# Patient Record
Sex: MALE | Race: OTHER | Employment: UNEMPLOYED | URBAN - METROPOLITAN AREA
[De-identification: names, ages, dates, MRNs, and addresses within clinical notes are randomized per-mention and may not be internally consistent; named-entity substitution may affect disease eponyms.]

---

## 2017-02-18 ENCOUNTER — HOSPITAL ENCOUNTER (OUTPATIENT)
Dept: MRI IMAGING | Age: 52
Discharge: HOME | End: 2017-02-18
Attending: ORTHOPAEDIC SURGERY

## 2017-02-18 DIAGNOSIS — M87.052 AVASCULAR NECROSIS OF BONE OF LEFT HIP (HCC): ICD-10-CM

## 2017-02-18 DIAGNOSIS — M16.12 ARTHRITIS OF LEFT HIP: ICD-10-CM

## 2017-02-27 ENCOUNTER — HOSPITAL ENCOUNTER (OUTPATIENT)
Dept: MRI IMAGING | Age: 52
Discharge: HOME | End: 2017-02-27
Attending: ORTHOPAEDIC SURGERY
Payer: MEDICARE

## 2017-02-27 DIAGNOSIS — M19.90 ARTHRITIS: ICD-10-CM

## 2017-02-27 PROCEDURE — 72195 MRI PELVIS W/O DYE: CPT

## 2017-06-01 ENCOUNTER — OFFICE VISIT (OUTPATIENT)
Dept: FAMILY MEDICINE CLINIC | Age: 52
End: 2017-06-01

## 2017-06-01 DIAGNOSIS — Z71.89 COUNSELING AND COORDINATION OF CARE: Primary | ICD-10-CM

## 2018-07-24 ENCOUNTER — APPOINTMENT (OUTPATIENT)
Dept: GENERAL RADIOLOGY | Age: 53
End: 2018-07-24
Attending: PHYSICIAN ASSISTANT
Payer: MEDICARE

## 2018-07-24 ENCOUNTER — HOSPITAL ENCOUNTER (EMERGENCY)
Age: 53
Discharge: HOME | End: 2018-07-24
Attending: EMERGENCY MEDICINE
Payer: MEDICARE

## 2018-07-24 VITALS
DIASTOLIC BLOOD PRESSURE: 112 MMHG | HEIGHT: 66 IN | OXYGEN SATURATION: 96 % | SYSTOLIC BLOOD PRESSURE: 177 MMHG | WEIGHT: 249 LBS | TEMPERATURE: 98.4 F | RESPIRATION RATE: 20 BRPM | HEART RATE: 97 BPM | BODY MASS INDEX: 40.02 KG/M2

## 2018-07-24 DIAGNOSIS — M79.89 LEFT ARM SWELLING: Primary | ICD-10-CM

## 2018-07-24 DIAGNOSIS — M79.602 LEFT ARM PAIN: ICD-10-CM

## 2018-07-24 LAB
ANION GAP SERPL CALC-SCNC: 6 MMOL/L (ref 5–15)
BASOPHILS # BLD: 0 K/UL (ref 0–0.1)
BASOPHILS NFR BLD: 0 % (ref 0–1)
BUN SERPL-MCNC: 7 MG/DL (ref 6–20)
BUN/CREAT SERPL: 6 (ref 12–20)
CALCIUM SERPL-MCNC: 8.4 MG/DL (ref 8.5–10.1)
CHLORIDE SERPL-SCNC: 100 MMOL/L (ref 97–108)
CO2 SERPL-SCNC: 28 MMOL/L (ref 21–32)
CREAT SERPL-MCNC: 1.19 MG/DL (ref 0.7–1.3)
DIFFERENTIAL METHOD BLD: NORMAL
EOSINOPHIL # BLD: 0.2 K/UL (ref 0–0.4)
EOSINOPHIL NFR BLD: 2 % (ref 0–7)
ERYTHROCYTE [DISTWIDTH] IN BLOOD BY AUTOMATED COUNT: 11.9 % (ref 11.5–14.5)
GLUCOSE SERPL-MCNC: 268 MG/DL (ref 65–100)
HCT VFR BLD AUTO: 41.4 % (ref 36.6–50.3)
HGB BLD-MCNC: 14 G/DL (ref 12.1–17)
IMM GRANULOCYTES # BLD: 0 K/UL (ref 0–0.04)
IMM GRANULOCYTES NFR BLD AUTO: 0 % (ref 0–0.5)
LYMPHOCYTES # BLD: 2.6 K/UL (ref 0.8–3.5)
LYMPHOCYTES NFR BLD: 24 % (ref 12–49)
MCH RBC QN AUTO: 30.5 PG (ref 26–34)
MCHC RBC AUTO-ENTMCNC: 33.8 G/DL (ref 30–36.5)
MCV RBC AUTO: 90.2 FL (ref 80–99)
MONOCYTES # BLD: 1 K/UL (ref 0–1)
MONOCYTES NFR BLD: 10 % (ref 5–13)
NEUTS SEG # BLD: 7 K/UL (ref 1.8–8)
NEUTS SEG NFR BLD: 65 % (ref 32–75)
NRBC # BLD: 0 K/UL (ref 0–0.01)
NRBC BLD-RTO: 0 PER 100 WBC
PLATELET # BLD AUTO: 268 K/UL (ref 150–400)
PMV BLD AUTO: 10.3 FL (ref 8.9–12.9)
POTASSIUM SERPL-SCNC: 4 MMOL/L (ref 3.5–5.1)
RBC # BLD AUTO: 4.59 M/UL (ref 4.1–5.7)
SODIUM SERPL-SCNC: 134 MMOL/L (ref 136–145)
URATE SERPL-MCNC: 6.3 MG/DL (ref 3.5–7.2)
WBC # BLD AUTO: 10.9 K/UL (ref 4.1–11.1)

## 2018-07-24 PROCEDURE — 73130 X-RAY EXAM OF HAND: CPT

## 2018-07-24 PROCEDURE — 74011250636 HC RX REV CODE- 250/636: Performed by: PHYSICIAN ASSISTANT

## 2018-07-24 PROCEDURE — 93971 EXTREMITY STUDY: CPT

## 2018-07-24 PROCEDURE — 99281 EMR DPT VST MAYX REQ PHY/QHP: CPT

## 2018-07-24 PROCEDURE — 80048 BASIC METABOLIC PNL TOTAL CA: CPT | Performed by: PHYSICIAN ASSISTANT

## 2018-07-24 PROCEDURE — 85025 COMPLETE CBC W/AUTO DIFF WBC: CPT | Performed by: PHYSICIAN ASSISTANT

## 2018-07-24 PROCEDURE — 84550 ASSAY OF BLOOD/URIC ACID: CPT | Performed by: PHYSICIAN ASSISTANT

## 2018-07-24 PROCEDURE — 96374 THER/PROPH/DIAG INJ IV PUSH: CPT

## 2018-07-24 PROCEDURE — 36415 COLL VENOUS BLD VENIPUNCTURE: CPT | Performed by: PHYSICIAN ASSISTANT

## 2018-07-24 PROCEDURE — 96375 TX/PRO/DX INJ NEW DRUG ADDON: CPT

## 2018-07-24 RX ORDER — MORPHINE SULFATE 4 MG/ML
4 INJECTION INTRAVENOUS
Status: COMPLETED | OUTPATIENT
Start: 2018-07-24 | End: 2018-07-24

## 2018-07-24 RX ORDER — PREDNISONE 10 MG/1
TABLET ORAL
Qty: 21 TAB | Refills: 0 | Status: SHIPPED | OUTPATIENT
Start: 2018-07-24 | End: 2018-07-24

## 2018-07-24 RX ORDER — OXYCODONE AND ACETAMINOPHEN 5; 325 MG/1; MG/1
1 TABLET ORAL
Status: DISCONTINUED | OUTPATIENT
Start: 2018-07-24 | End: 2018-07-24

## 2018-07-24 RX ORDER — PREDNISONE 20 MG/1
60 TABLET ORAL
Status: DISCONTINUED | OUTPATIENT
Start: 2018-07-24 | End: 2018-07-24

## 2018-07-24 RX ORDER — OXYCODONE AND ACETAMINOPHEN 5; 325 MG/1; MG/1
1 TABLET ORAL
Qty: 20 TAB | Refills: 0 | Status: SHIPPED | OUTPATIENT
Start: 2018-07-24 | End: 2019-02-27

## 2018-07-24 RX ORDER — PREDNISONE 10 MG/1
TABLET ORAL
Qty: 21 TAB | Refills: 0 | Status: SHIPPED | OUTPATIENT
Start: 2018-07-24 | End: 2019-02-27

## 2018-07-24 RX ADMIN — MORPHINE SULFATE 4 MG: 4 INJECTION INTRAVENOUS at 01:49

## 2018-07-24 RX ADMIN — METHYLPREDNISOLONE SODIUM SUCCINATE 125 MG: 125 INJECTION, POWDER, FOR SOLUTION INTRAMUSCULAR; INTRAVENOUS at 01:49

## 2018-07-24 NOTE — ED PROVIDER NOTES
HPI Comments: 45 y/o right-hand dominant male with PMHx of HTN, HLD, gout, sleep apnea and sciatica, presenting with complaint of left wrist and arm swelling. The patient states that he noticed some pain in his left wrist 3 days ago that initially felt similar to his typical gout flares. However the pain has been worsening over the past few days, with associated swelling of his wrist, hand and fingers. Today the pain began to spread up his arm to his shoulder, which prompted him to present to the ED for evaluation. He denies any recent falls, cuts, abrasions or other traumatic injuries. The pain is 10/10, described as sharp and burning. He has tried indomethacin with minimal relief. He endorses tingling in his finger tips but denies fever chills, redness, weakness or numbness. The history is provided by the patient. Past Medical History:   Diagnosis Date    Arthritis     gout    Gout     Heart disease     Hypercholesterolemia     Hypertension     Obstructive sleep apnea (adult) (pediatric) 11/30/2012    Other ill-defined conditions(799.89)     LE varicosities, hyperlipidemia    Sciatica     Unspecified essential hypertension 11/30/2012       Past Surgical History:   Procedure Laterality Date    HX APPENDECTOMY      HX ORTHOPAEDIC      L gt toe         Family History:   Problem Relation Age of Onset    Diabetes Mother     Hypertension Mother        Social History     Social History    Marital status:      Spouse name: N/A    Number of children: N/A    Years of education: N/A     Occupational History    Not on file.      Social History Main Topics    Smoking status: Former Smoker     Types: Cigarettes     Quit date: 12/16/1990    Smokeless tobacco: Never Used    Alcohol use 0.0 oz/week     0 Standard drinks or equivalent per week      Comment: social    Drug use: No    Sexual activity: Yes     Partners: Female     Other Topics Concern    Not on file     Social History Narrative ** Merged History Encounter **              ALLERGIES: Diovan [valsartan]; Diovan [valsartan]; Elavil; and Elavil    Review of Systems   Constitutional: Negative for chills and fever. Respiratory: Negative for shortness of breath. Cardiovascular: Negative for chest pain. Gastrointestinal: Negative for nausea and vomiting. Musculoskeletal: Positive for arthralgias (left hand and wrist pain). Negative for myalgias. Skin: Negative for wound. Neurological: Negative for weakness and numbness. All other systems reviewed and are negative. Vitals:    07/24/18 0110   BP: (!) 219/127   Pulse: (!) 101   Resp: 20   Temp: 98.4 °F (36.9 °C)   SpO2: 96%   Weight: 112.9 kg (249 lb)   Height: 5' 6\" (1.676 m)            Physical Exam   Constitutional: He is oriented to person, place, and time. He appears well-developed and well-nourished. No distress. HENT:   Head: Normocephalic and atraumatic. Eyes: Conjunctivae and EOM are normal.   Neck: Normal range of motion. Neck supple. Cardiovascular: Normal rate. Pulses:       Radial pulses are 2+ on the right side, and 2+ on the left side. Pulmonary/Chest: Effort normal. No respiratory distress. Musculoskeletal:   Left wrist and hand with generalized swelling and tenderness to palpation. No erythema or increased warmth. Fingers are warm with brisk capillary refill. Minimal tenderness of proximal forearm and upper arm. Neurological: He is alert and oriented to person, place, and time. Skin: Skin is warm and dry. He is not diaphoretic. Nursing note and vitals reviewed.        MDM  Number of Diagnoses or Management Options     Amount and/or Complexity of Data Reviewed  Clinical lab tests: ordered and reviewed  Tests in the radiology section of CPT®: ordered and reviewed  Discuss the patient with other providers: yes ( [facility], ED attending)    Patient Progress  Patient progress: stable        ED Course       Procedures      45 y/o right-hand dominant male with PMHx of HTN, HLD, gout, sleep apnea and sciatica, presenting with complaint of left wrist and arm swelling. History and exam concerning for possible gout flare, cellulitis, DVT or fracture. Will obtain LUE duplex ultrasound, XR left hand, CBC, BMP and uric acid level. Labs, XR and ultrasound pending. Care of this patient transferred to Dr. Idalmis Tay at 2:00 AM, please see his documentation for further ED course and disposition.

## 2018-07-24 NOTE — DISCHARGE INSTRUCTIONS
We hope that we have addressed all of your medical concerns. The examination and treatment you received in the Emergency Department were for an emergent problem and were not intended as complete care. It is important that you follow up with your healthcare provider(s) for ongoing care. If your symptoms worsen or do not improve as expected, and you are unable to reach your usual health care provider(s), you should return to the Emergency Department. Today's healthcare is undergoing tremendous change, and patient satisfaction surveys are one of the many tools to assess the quality of medical care. You may receive a survey from the [employer] regarding your experience in the Emergency Department. I hope that your experience has been completely positive, particularly the medical care that I provided. As such, please participate in the survey; anything less than excellent does not meet my expectations or intentions. [facility] and [address] [place] [address] participate in nationally recognized quality of care measures. If your blood pressure is greater than 120/80, as reported below, we urge that you seek medical care to address the potential of high blood pressure, commonly known as hypertension. Hypertension can be hereditary or can be caused by certain medical conditions, pain, stress, or \"white coat syndrome. \"       Please make an appointment with your health care provider(s) for follow up of your Emergency Department visit. VITALS:   Patient Vitals for the past 8 hrs:   Temp Pulse Resp BP SpO2   07/24/18 0110 98.4 °F (36.9 °C) (!) 101 20 (!) 219/127 96 %          Thank you for allowing us to provide you with medical care today. We realize that you have many choices for your emergency care needs. Please choose us in the future for any continued health care needs. Kelvin Ingram [address] [facility]   Office: [phone]            Recent Results (from the past 24 hour(s))   CBC WITH AUTOMATED DIFF    Collection Time: 07/24/18  1:47 AM   Result Value Ref Range    WBC 10.9 4.1 - 11.1 K/uL    RBC 4.59 4. 10 - 5.70 M/uL    HGB 14.0 12.1 - 17.0 g/dL    HCT 41.4 36.6 - 50.3 %    MCV 90.2 80.0 - 99.0 FL    MCH 30.5 26.0 - 34.0 PG    MCHC 33.8 30.0 - 36.5 g/dL    RDW 11.9 11.5 - 14.5 %    PLATELET 296 223 - 000 K/uL    MPV 10.3 8.9 - 12.9 FL    NRBC 0.0 0  WBC    ABSOLUTE NRBC 0.00 0.00 - 0.01 K/uL    NEUTROPHILS 65 32 - 75 %    LYMPHOCYTES 24 12 - 49 %    MONOCYTES 10 5 - 13 %    EOSINOPHILS 2 0 - 7 %    BASOPHILS 0 0 - 1 %    IMMATURE GRANULOCYTES 0 0.0 - 0.5 %    ABS. NEUTROPHILS 7.0 1.8 - 8.0 K/UL    ABS. LYMPHOCYTES 2.6 0.8 - 3.5 K/UL    ABS. MONOCYTES 1.0 0.0 - 1.0 K/UL    ABS. EOSINOPHILS 0.2 0.0 - 0.4 K/UL    ABS. BASOPHILS 0.0 0.0 - 0.1 K/UL    ABS. IMM. GRANS. 0.0 0.00 - 0.04 K/UL    DF AUTOMATED     METABOLIC PANEL, BASIC    Collection Time: 07/24/18  1:47 AM   Result Value Ref Range    Sodium 134 (L) 136 - 145 mmol/L    Potassium 4.0 3.5 - 5.1 mmol/L    Chloride 100 97 - 108 mmol/L    CO2 28 21 - 32 mmol/L    Anion gap 6 5 - 15 mmol/L    Glucose 268 (H) 65 - 100 mg/dL    BUN 7 6 - 20 MG/DL    Creatinine 1.19 0.70 - 1.30 MG/DL    BUN/Creatinine ratio 6 (L) 12 - 20      GFR est AA >60 >60 ml/min/1.73m2    GFR est non-AA >60 >60 ml/min/1.73m2    Calcium 8.4 (L) 8.5 - 10.1 MG/DL   URIC ACID    Collection Time: 07/24/18  1:47 AM   Result Value Ref Range    Uric acid 6.3 3.5 - 7.2 MG/DL       Xr Hand Lt Min 3 V    Result Date: 7/24/2018  EXAM:  XR HAND LT MIN 3 V INDICATION:  swelling and pain. COMPARISON: None. FINDINGS: Three views of the left hand demonstrate no fracture, dislocation or other acute osseous or articular abnormality. Mild degree degenerative change in the basal joint. There is soft tissue swelling about the distal forearm, wrist and hand.    No visible radiopaque foreign body IMPRESSION:  1. Swelling in the distal forearm, wrist and hand.

## 2018-07-24 NOTE — PROCEDURES
[address]  *** FINAL REPORT ***    Name: Amanda Montiel  MRN: [id number]    Outpatient  : 18 Dec 1965  HIS Order #: [id number]  [id number] [facility] Visit #: [id number]  Date: 2018    TYPE OF TEST: Peripheral Venous Testing    REASON FOR TEST  Pain in limb, Limb swelling    Left Arm:-  Deep venous thrombosis:           No  Superficial venous thrombosis:    No      INTERPRETATION/FINDINGS  PROCEDURE:  LEFT UPPER EXTREMITY VENOUS DUPLEX. Evaluation of upper  extremity veins with ultrasound (B-mode imaging, pulsed Doppler, color   Doppler). Includes the internal jugular, subclavian, axillary,  brachial, radial, ulnar, basilic, and cephalic veins. FINDINGS:  Yesenia Gregory scale and color flow duplex images of the veins of the   left upper extremity and bilateral internal jugular veins demonstrate   compressibility, absence of filling defects, reflux or phlebitic  changes of the bilateral internal jugular, subclavian, axillary, upper   arm and forearm veins of the right arm. CONCLUSION:  Normal left upper extremity venous duplex. No deep vein  thrombosis or thrombophlebitis. No evidence of thrombus in  contralateral right subclavian vein. NOTE: Avascular, hyper echoic,  complex structure noted at the anterior wrist/hand measuring 2.51 x  0.73 consistant with a possible hematoma. ADDITIONAL COMMENTS    I have personally reviewed the data relevant to the interpretation of  this  study. TECHNOLOGIST: Timothy Barrera  Signed: 2018 03:30 AM    PHYSICIAN: Walt Mcdonough.  Trev Hamilton MD  Signed: 2018 07:36 AM

## 2019-01-23 ENCOUNTER — OFFICE VISIT (OUTPATIENT)
Dept: FAMILY MEDICINE CLINIC | Age: 54
End: 2019-01-23

## 2019-01-23 VITALS
BODY MASS INDEX: 39.28 KG/M2 | TEMPERATURE: 97.7 F | HEART RATE: 108 BPM | DIASTOLIC BLOOD PRESSURE: 81 MMHG | HEIGHT: 66 IN | RESPIRATION RATE: 17 BRPM | WEIGHT: 244.4 LBS | SYSTOLIC BLOOD PRESSURE: 142 MMHG | OXYGEN SATURATION: 95 %

## 2019-01-23 DIAGNOSIS — M25.562 ACUTE PAIN OF LEFT KNEE: Primary | ICD-10-CM

## 2019-01-23 DIAGNOSIS — M25.462 KNEE EFFUSION, LEFT: ICD-10-CM

## 2019-01-23 RX ORDER — GLIPIZIDE 5 MG/1
TABLET ORAL 2 TIMES DAILY
COMMUNITY
End: 2019-03-04 | Stop reason: SDUPTHER

## 2019-01-23 RX ORDER — LIDOCAINE HYDROCHLORIDE 10 MG/ML
3 INJECTION INFILTRATION; PERINEURAL ONCE
Qty: 3 ML | Refills: 0
Start: 2019-01-23 | End: 2019-01-23

## 2019-01-23 RX ORDER — TRIAMCINOLONE ACETONIDE 40 MG/ML
40 INJECTION, SUSPENSION INTRA-ARTICULAR; INTRAMUSCULAR ONCE
Qty: 1 ML | Refills: 0
Start: 2019-01-23 | End: 2019-01-23

## 2019-01-23 RX ORDER — METOPROLOL TARTRATE 100 MG/1
TABLET ORAL DAILY
COMMUNITY
End: 2019-03-04 | Stop reason: SDUPTHER

## 2019-01-23 RX ORDER — METFORMIN HYDROCHLORIDE 850 MG/1
TABLET ORAL 2 TIMES DAILY WITH MEALS
COMMUNITY
End: 2019-03-04 | Stop reason: SDUPTHER

## 2019-01-23 NOTE — PROGRESS NOTES
Sarita Cooper is a 48 y.o. male here today to address the following issues:  Chief Complaint   Patient presents with   [facility]    Medication Refill     Indomethacin     Here for complaints of new onset left knee pain. Stated 23 days ago. SHELBY: Twisted knee in [place], no pop. Has had an effusion since. Pain is sharp, and painful with any movement. Tried Aspercreme and Indomethacin and did not help much. Has not taken gout medication for over 6 months. Gout flares usually in knee and ankle. Past Medical History:   Diagnosis Date    Arthritis     gout    Gout     Heart disease     Hypercholesterolemia     Hypertension     Obstructive sleep apnea (adult) (pediatric) 2012    Other ill-defined conditions(799.89)     LE varicosities, hyperlipidemia    Sciatica     Unspecified essential hypertension 2012     Past Surgical History:   Procedure Laterality Date    HX APPENDECTOMY      HX ORTHOPAEDIC      L gt toe     Social History     Socioeconomic History    Marital status:      Spouse name: Not on file    Number of children: Not on file    Years of education: Not on file    Highest education level: Not on file   Social Needs    Financial resource strain: Not on file    Food insecurity - worry: Not on file    Food insecurity - inability: Not on file   [employer] needs - medical: Not on file   [employer] needs - non-medical: Not on file   Occupational History    Not on file   Tobacco Use    Smoking status: Former Smoker     Types: Cigarettes     Last attempt to quit: 1990     Years since quittin.1    Smokeless tobacco: Never Used   Substance and Sexual Activity    Alcohol use:  Yes     Alcohol/week: 0.0 oz     Comment: social    Drug use: No    Sexual activity: Yes     Partners: Female   Other Topics Concern    Not on file   Social History Narrative    ** Merged History Encounter **            Allergies   Allergen Reactions    Diovan [Valsartan] Other (comments)     Boils & knots al over head & upper body    Diovan [Valsartan] Hives    Elavil Other (comments)     Hallucinations. Seeing shadows and hearing voices.  Elavil Other (comments)     confusion    Statins-Hmg-Coa Reductase Inhibitors Other (comments)       Current Outpatient Medications   Medication Sig    metoprolol tartrate (LOPRESSOR) 100 mg IR tablet Take  by mouth two (2) times a day.  glipiZIDE (GLUCOTROL) 5 mg tablet Take  by mouth two (2) times a day.  allopurinol (ZYLOPRIM) 300 mg tablet Take 300 mg by mouth daily.  aspirin 81 mg chewable tablet Take 81 mg by mouth daily.  lisinopril (PRINIVIL, ZESTRIL) 40 mg tablet Take 40 mg by mouth daily.  indomethacin (INDOCIN) 25 mg capsule 1-2 tid prn pain, take with food.  lisinopril (PRINIVIL, ZESTRIL) 40 mg tablet Take 1 Tab by mouth daily.  metFORMIN (GLUCOPHAGE) 850 mg tablet Take  by mouth two (2) times daily (with meals).  oxyCODONE-acetaminophen (PERCOCET) 5-325 mg per tablet Take 1 Tab by mouth every four (4) hours as needed for Pain. Max Daily Amount: 6 Tabs.  predniSONE (STERAPRED DS) 10 mg dose pack Take as directed.  allopurinol (ZYLOPRIM) 100 mg tablet Take 1 Tab by mouth daily.  carvedilol (COREG) 12.5 mg tablet Take 1 Tab by mouth two (2) times daily (with meals).  amLODIPine (NORVASC) 10 mg tablet Take  by mouth daily.  carvedilol (COREG) 6.25 mg tablet Take  by mouth two (2) times daily (with meals).  gemfibrozil (LOPID) 600 mg tablet Take 600 mg by mouth two (2) times a day.  methocarbamol (ROBAXIN) 750 mg tablet Take 1 Tab by mouth four (4) times daily. Indications: MUSCLE SPASM    indomethacin (INDOCIN) 50 mg capsule     amLODIPine (NORVASC) 10 mg tablet Take 1 Tab by mouth daily.  gemfibrozil (LOPID) 600 mg tablet Take 1 Tab by mouth two (2) times a day.  Aspirin, Buffered 81 mg tab Take  by mouth.      No current facility-administered medications for this visit. Review of Systems   Constitutional: Negative for fever. Musculoskeletal: Positive for joint pain and myalgias. Visit Vitals  /81   Pulse (!) 108   Temp 97.7 °F (36.5 °C) (Oral)   Resp 17   Ht 5' 6\" (1.676 m)   Wt 244 lb 6.4 oz (110.9 kg)   SpO2 95%   BMI 39.45 kg/m²       Physical Exam    Gen: Well appearing. No apparent distress. Alert and oriented. Responds to all questions appropriately. Lungs: No labored respirations. Talking in complete sentences without difficulty. Musculoskeletal:  Knee: LEFT  Knee Effusion: Large  Quadriceps atrophy: None   Popliteal (Bakers) Cyst: Negative   Patellofemoral crepitus: Negative    ROM:  Flexion: 110  Extension: 0     Alignment:  Patellar tracking: +1    Dynamic Test:  Gait: Antalgic  Assistive devices: None    Palpation:   Patella tenderness: None  Patellar tendon tenderness: None  Quad tendon tenderness: None  Medial joint line tenderness: None  Lateral joint line tenderness: None  MCL tenderness: None  LCL tenderness: None  Medial facet tenderness: None  Lateral facet tenderness: None  Condyle tenderness: None  Tibia tubercle tenderness: None  Proximal fibula tenderness: None  Plica tenderness: None  Prepatellar bursa tenderness: None  Pes bursa tenderness: None  ITB tenderness: None    Ligament/Meniscal Exam:  Patellar apprehension (medial/lateral): Negative   Lachman: Negative, with good endpoint   Anterior Drawer: Negative   Posterior Drawer: Negative   Valgus stress: Negative with good endpoint   Varus stress: Negative with good endpoint     Neuro/Vascular : Pulses intact, no edema, and neurologically intact . Skin: No obvious rash or skin breakdown. No results found for this or any previous visit (from the past 12 hour(s)). 1. Acute pain of left knee  - XR KNEE LT 3 V; Future    2. Knee effusion, left  - CELL COUNT AND DIFF, BODY FLUID  - CRYSTALS, SYNOVIAL FLUID    Likely gout flare. XR reviewed and showing mild OA. Follow up in 1 month and check labs. Patient has not been taking his allopurinol for the past 6 months. States it did not help and was last taken this. Follow-up Disposition:  Return in about 1 month (around 2/23/2019) for Follow up knee pain/gout flare . Derrek Sawyer MD, CAQSM, RMSK      [facility]  OFFICE PROCEDURE PROGRESS NOTE        Chart reviewed for the following:   Suzanne Gongora MD, have reviewed the History, Physical and updated the Allergic reactions for [address] performed immediately prior to start of procedure:   Suzanne Gongora MD, have performed the following reviews on [facility] prior to the start of the procedure:            * Patient was identified by name and date of birth   * Agreement on procedure being performed was verified  * Risks and Benefits explained to the patient  * Procedure site verified and marked as necessary  * Patient was positioned for comfort  * Consent was signed and verified     Time: 2:37pm      Date of procedure: 1/23/2019    Procedure performed by:  Scott Raines MD    Provider assisted by: Dennise Mclaughlin LPN    Patient assisted by: wife    Indications:   Unexplained joint effusion    Procedure:  After verbal consent was obtained, risks and benefits of the procedure were discussed with the patient and alternatives discussed. Time out performed, cross checking patient ID and procedure. Confirmed that the patient does not have history of prior adverse reactions, active infections, or relevant allergies. There was erythema, or warmth, and the skin was clear. The skin was cleaned using chlorohexadine x 2. Topical anesthesia was achieved with ethyl chloride. A 18 gauge needle was inserted into the left knee via a lateral approach. The site was injected with a mixture of 40mg of Kenalog and  3ml 1% Lidocaine. The injection was completed without complication, and a bandage was applied.  25cc of clear, straw-like fluid was drained. Patient felt 50% better after injection. After Care Instructions: The patient is asked to continue to rest the joint for a few more days before resuming regular activities. It may be more painful for the first 1-2 days. Watch for fever, or increased swelling or persistent pain in the joint. Call or return to clinic prn if such symptoms occur or there is failure to improve as anticipated.

## 2019-01-23 NOTE — PROGRESS NOTES
Chief Complaint   Patient presents with   [facility] Establ[facility] Care    Medication Refill     Indomethacin     Blood pressure 142/81, pulse (!) 108, temperature 97.7 °F (36.5 °C), temperature source Oral, resp. rate 17, height 5' 6\" (1.676 m), weight 244 lb 6.4 oz (110.9 kg), SpO2 95 %. 1. Have you been to the ER, urgent care clinic since your last visit? Hospitalized since your last visit? No    2. Have you seen or consulted any other health care providers outside of the [address] [place] [address] since your last visit? Include any pap smears or colon screening.  No

## 2019-01-23 NOTE — PATIENT INSTRUCTIONS
Gota: Instrucciones de cuidado - [ Gout: Care Instructions ]  Instrucciones de cuidado    La gota es rafa forma de artritis causada por la acumulación de rosas de ácido úrico en rafa articulación. Causa ataques repentinos de dolor, hinchazón, enrojecimiento y rigidez, por lo general en rafa articulación, sobre todo en el dedo mgao del pie. La gota usualmente se presenta sin rafa causa evidente. Arpit puede desencadenarse debido al consumo de alcohol (especialmente cerveza), mariscos y smiley oconnell. Yamilex ciertos medicamentos, jack diuréticos o aspirina, también puede ocasionar un ataque de [place]. Yamilex los medicamentos miguel jack se los receten y asistir con regularidad a las consultas de seguimiento con adams médico pueden ayudarle a evitar ataques de gota en el futuro. La atención de seguimiento es rafa parte clave de adams tratamiento y seguridad. Asegúrese de hacer y acudir a todas las citas, y llame a adams médico si está teniendo problemas. También es rafa buena idea saber los resultados de olvin exámenes y mantener rafa lista de los medicamentos que diane. ¿Cómo puede cuidarse en el hogar? · Si la articulación está hinchada, colóquese hielo o rafa compresa fría en la felicia larry 10 a 20 minutos cada vez. Póngase un paño ramachandran entre el hielo y la piel. · Eleve el miembro adolorido sobre rafa almohada mientras se aplica hielo, o en cualquier momento que se siente o se acueste larry los 3 días siguientes. Trate de mantenerlo por encima del nivel del corazón. Cheval ayudará a reducir la hinchazón. · Descanse las articulaciones adoloridas. Evite las actividades que impliquen poner peso o esfuerzo sobre las articulaciones por unos kanwal. Benjamin breves descansos en olvin actividades regulares larry el día. · [employer] medicamentos exactamente jack le fueron recetados. Llame a adams médico si dru estar teniendo problemas con adams medicamento.   · [employer] analgésicos (medicamentos para el dolor) exactamente según las indicaciones. ? Si el médico le recetó un analgésico, tómelo según las indicaciones. ? Si no está tomando un analgésico recetado, pregúntele a adams médico si puede ivana sharon de [employer]. · Coma menos mariscos y smiley oconnell. · Pregúntele a adams médico antes de ivana alcohol. · Perder peso, si tiene sobrepeso, podría ayudarle a reducir los ataques de Lucas. Arpit no jon rafa Ronan Douse. La pérdida de un peso considerable en corto tiempo puede provocar un ataque de Lucas. ¿Cuándo debe pedir ayuda? Llame a adams médico ahora mismo o busque atención médica inmediata si:    · Tiene fiebre.     · La articulación le duele tanto que no puede usarla.     · Tiene repentina e inexplicable hinchazón, enrojecimiento, calor o dolor intenso en rafa o más articulaciones.    Preste especial atención a los cambios en adams reid y asegúrese de comunicarse con adams médico si:    · Tiene dolor en rafa articulación.     · Thi síntomas empeoran o no mejoran después de 2 ó 3 días. ¿Dónde puede encontrar más información en inglés? Ksenia Hayes a http://deon-promise.info/. [facility] I894 en la búsqueda para aprender más acerca de \"Gota: Instrucciones de cuidado - [ Gout: Care Instructions ]. \"  Revisado: 10 merle, 2018  Versión del contenido: 11.9  © 3163-3201 -[employer]. Las instrucciones de cuidado fueron adaptadas bajo licencia por [employer] (which disclaims liability or warranty for this information). Si usted tiene [place] afección médica o sobre estas instrucciones, siempre pregunte a adams profesional de reid. [facility] niega toda garantía o responsabilidad por adams uso de esta información.

## 2019-01-24 LAB
APPEARANCE FLD: ABNORMAL
CIL COLUMNAR LINING CELLS FLD: 0 %
COLOR FLD: YELLOW
CRYSTALS FLD MICRO: NORMAL
EOSINOPHIL NFR FLD MANUAL: 0 %
LYMPHOCYTES NFR FLD MANUAL: 5 %
MACROPHAGES NFR FLD MANUAL: 12 %
NEUTROPHILS NFR FLD MANUAL: 83 %
NUC CELL # FLD: 2697 CELLS/UL (ref 0–200)
RBC # FLD AUTO: ABNORMAL /UL

## 2019-02-01 ENCOUNTER — HOSPITAL ENCOUNTER (EMERGENCY)
Age: 54
Discharge: HOME | End: 2019-02-01
Attending: EMERGENCY MEDICINE
Payer: MEDICARE

## 2019-02-01 ENCOUNTER — OFFICE VISIT (OUTPATIENT)
Dept: FAMILY MEDICINE CLINIC | Age: 54
End: 2019-02-01

## 2019-02-01 VITALS
TEMPERATURE: 97.7 F | RESPIRATION RATE: 14 BRPM | WEIGHT: 235 LBS | OXYGEN SATURATION: 98 % | HEART RATE: 83 BPM | SYSTOLIC BLOOD PRESSURE: 145 MMHG | DIASTOLIC BLOOD PRESSURE: 86 MMHG | BODY MASS INDEX: 37.93 KG/M2

## 2019-02-01 VITALS
BODY MASS INDEX: 37.77 KG/M2 | WEIGHT: 235 LBS | HEIGHT: 66 IN | OXYGEN SATURATION: 96 % | DIASTOLIC BLOOD PRESSURE: 79 MMHG | TEMPERATURE: 98 F | SYSTOLIC BLOOD PRESSURE: 112 MMHG | HEART RATE: 106 BPM | RESPIRATION RATE: 16 BRPM

## 2019-02-01 DIAGNOSIS — H53.8 BLURRY VISION: Primary | ICD-10-CM

## 2019-02-01 DIAGNOSIS — R73.9 HYPERGLYCEMIA: Primary | ICD-10-CM

## 2019-02-01 DIAGNOSIS — R35.89 POLYURIA: ICD-10-CM

## 2019-02-01 PROBLEM — E66.01 SEVERE OBESITY (HCC): Status: ACTIVE | Noted: 2019-02-01

## 2019-02-01 LAB
ALBUMIN SERPL-MCNC: 3.3 G/DL (ref 3.5–5)
ALBUMIN/GLOB SERPL: 0.8 {RATIO} (ref 1.1–2.2)
ALP SERPL-CCNC: 134 U/L (ref 45–117)
ALT SERPL-CCNC: 51 U/L (ref 12–78)
ANION GAP SERPL CALC-SCNC: 11 MMOL/L (ref 5–15)
APPEARANCE UR: CLEAR
AST SERPL-CCNC: 39 U/L (ref 15–37)
BACTERIA URNS QL MICRO: NEGATIVE /HPF
BASE DEFICIT BLDV-SCNC: 3.5 MMOL/L
BASOPHILS # BLD: 0 K/UL (ref 0–0.1)
BASOPHILS NFR BLD: 0 % (ref 0–1)
BDY SITE: ABNORMAL
BILIRUB SERPL-MCNC: 0.6 MG/DL (ref 0.2–1)
BILIRUB UR QL STRIP: NEGATIVE
BILIRUB UR QL: NEGATIVE
BUN SERPL-MCNC: 15 MG/DL (ref 6–20)
BUN/CREAT SERPL: 13 (ref 12–20)
CALCIUM SERPL-MCNC: 9.2 MG/DL (ref 8.5–10.1)
CHLORIDE SERPL-SCNC: 101 MMOL/L (ref 97–108)
CO2 SERPL-SCNC: 24 MMOL/L (ref 21–32)
COLOR UR: ABNORMAL
COMMENT, HOLDF: NORMAL
CREAT SERPL-MCNC: 1.2 MG/DL (ref 0.7–1.3)
DIFFERENTIAL METHOD BLD: ABNORMAL
EOSINOPHIL # BLD: 0.2 K/UL (ref 0–0.4)
EOSINOPHIL NFR BLD: 2 % (ref 0–7)
EPITH CASTS URNS QL MICRO: ABNORMAL /LPF
ERYTHROCYTE [DISTWIDTH] IN BLOOD BY AUTOMATED COUNT: 12 % (ref 11.5–14.5)
GLOBULIN SER CALC-MCNC: 4.3 G/DL (ref 2–4)
GLUCOSE BLD STRIP.AUTO-MCNC: 215 MG/DL (ref 65–100)
GLUCOSE BLD STRIP.AUTO-MCNC: 373 MG/DL (ref 65–100)
GLUCOSE DOSE-GTT, POCT, GLDSPOCT: 402
GLUCOSE SERPL-MCNC: 380 MG/DL (ref 65–100)
GLUCOSE UR STRIP.AUTO-MCNC: >1000 MG/DL
GLUCOSE UR-MCNC: NORMAL MG/DL
HCO3 BLDV-SCNC: 22 MMOL/L (ref 23–28)
HCT VFR BLD AUTO: 47.1 % (ref 36.6–50.3)
HGB BLD-MCNC: 15.9 G/DL (ref 12.1–17)
HGB UR QL STRIP: NEGATIVE
HYALINE CASTS URNS QL MICRO: ABNORMAL /LPF (ref 0–5)
IMM GRANULOCYTES # BLD AUTO: 0 K/UL (ref 0–0.04)
IMM GRANULOCYTES NFR BLD AUTO: 0 % (ref 0–0.5)
KETONES P FAST UR STRIP-MCNC: NEGATIVE MG/DL
KETONES UR QL STRIP.AUTO: NEGATIVE MG/DL
LEUKOCYTE ESTERASE UR QL STRIP.AUTO: NEGATIVE
LYMPHOCYTES # BLD: 3.3 K/UL (ref 0.8–3.5)
LYMPHOCYTES NFR BLD: 28 % (ref 12–49)
MCH RBC QN AUTO: 30.2 PG (ref 26–34)
MCHC RBC AUTO-ENTMCNC: 33.8 G/DL (ref 30–36.5)
MCV RBC AUTO: 89.4 FL (ref 80–99)
MONOCYTES # BLD: 0.7 K/UL (ref 0–1)
MONOCYTES NFR BLD: 6 % (ref 5–13)
NEUTS SEG # BLD: 7.2 K/UL (ref 1.8–8)
NEUTS SEG NFR BLD: 63 % (ref 32–75)
NITRITE UR QL STRIP.AUTO: NEGATIVE
NRBC # BLD: 0 K/UL (ref 0–0.01)
NRBC BLD-RTO: 0 PER 100 WBC
PCO2 BLDV: 41 MMHG (ref 41–51)
PH BLDV: 7.35 [PH] (ref 7.32–7.42)
PH UR STRIP: 5 [PH] (ref 5–8)
PH UR STRIP: 5.5 [PH] (ref 4.6–8)
PLATELET # BLD AUTO: 310 K/UL (ref 150–400)
PMV BLD AUTO: 10.9 FL (ref 8.9–12.9)
PO2 BLDV: 33 MMHG (ref 25–40)
POTASSIUM SERPL-SCNC: 4.1 MMOL/L (ref 3.5–5.1)
PROT SERPL-MCNC: 7.6 G/DL (ref 6.4–8.2)
PROT UR QL STRIP: NEGATIVE
PROT UR STRIP-MCNC: NEGATIVE MG/DL
RBC # BLD AUTO: 5.27 M/UL (ref 4.1–5.7)
RBC #/AREA URNS HPF: ABNORMAL /HPF (ref 0–5)
SAMPLES BEING HELD,HOLD: NORMAL
SAO2 % BLDV: 60 % (ref 65–88)
SAO2% DEVICE SAO2% SENSOR NAME: ABNORMAL
SERVICE CMNT-IMP: ABNORMAL
SERVICE CMNT-IMP: ABNORMAL
SODIUM SERPL-SCNC: 136 MMOL/L (ref 136–145)
SP GR UR REFRACTOMETRY: 1.02 (ref 1–1.03)
SP GR UR STRIP: 1 (ref 1–1.03)
SPECIMEN SITE: ABNORMAL
UA UROBILINOGEN AMB POC: NORMAL (ref 0.2–1)
UR CULT HOLD, URHOLD: NORMAL
URINALYSIS CLARITY POC: CLEAR
URINALYSIS COLOR POC: YELLOW
URINE BLOOD POC: NEGATIVE
URINE LEUKOCYTES POC: NEGATIVE
URINE NITRITES POC: NEGATIVE
UROBILINOGEN UR QL STRIP.AUTO: 0.2 EU/DL (ref 0.2–1)
WBC # BLD AUTO: 11.5 K/UL (ref 4.1–11.1)
WBC URNS QL MICRO: ABNORMAL /HPF (ref 0–4)

## 2019-02-01 PROCEDURE — 99284 EMERGENCY DEPT VISIT MOD MDM: CPT

## 2019-02-01 PROCEDURE — 85025 COMPLETE CBC W/AUTO DIFF WBC: CPT

## 2019-02-01 PROCEDURE — 96361 HYDRATE IV INFUSION ADD-ON: CPT

## 2019-02-01 PROCEDURE — 81001 URINALYSIS AUTO W/SCOPE: CPT

## 2019-02-01 PROCEDURE — 74011250636 HC RX REV CODE- 250/636: Performed by: STUDENT IN AN ORGANIZED HEALTH CARE EDUCATION/TRAINING PROGRAM

## 2019-02-01 PROCEDURE — 36415 COLL VENOUS BLD VENIPUNCTURE: CPT

## 2019-02-01 PROCEDURE — 74011636637 HC RX REV CODE- 636/637: Performed by: EMERGENCY MEDICINE

## 2019-02-01 PROCEDURE — 82962 GLUCOSE BLOOD TEST: CPT

## 2019-02-01 PROCEDURE — 82803 BLOOD GASES ANY COMBINATION: CPT

## 2019-02-01 PROCEDURE — 96374 THER/PROPH/DIAG INJ IV PUSH: CPT

## 2019-02-01 PROCEDURE — 80053 COMPREHEN METABOLIC PANEL: CPT

## 2019-02-01 PROCEDURE — 74011250636 HC RX REV CODE- 250/636: Performed by: EMERGENCY MEDICINE

## 2019-02-01 RX ADMIN — SODIUM CHLORIDE 1000 ML: 900 INJECTION, SOLUTION INTRAVENOUS at 13:14

## 2019-02-01 RX ADMIN — INSULIN HUMAN 10 UNITS: 100 INJECTION, SOLUTION PARENTERAL at 13:17

## 2019-02-01 NOTE — DISCHARGE INSTRUCTIONS
Patient Education        Tierney Neigh el azúcar rafael en la curly - [ Learning About High Blood Sugar ]  ¿Qué es el azúcar rafael en la curly? El cuerpo [employer] alimentos que usted come en glucosa (azúcar), la cual [place] para obtener energía. Arpit si adams cuerpo es incapaz de utilizar el azúcar de inmediato, puede acumularse en la curly y provocar un nivel alto de azúcar en la curly. Cuando la cantidad de azúcar en la curly permanece muy rafael por demasiado tiempo, usted puede tener diabetes. La diabetes es [facility] enfermedad que puede causar problemas graves de [facility]. Lo brody es que hacer cambios en adams estilo de roxann puede ayudarle a hacer que el azúcar en la curly vuelva a un nivel normal y ayudarle a evitar o retrasar la diabetes. ¿Cuál es la causa del azúcar rafael en la curly? El azúcar (glucosa) puede acumularse en la curly si usted:  · Tiene sobrepeso. · Tiene antecedentes familiares de diabetes. · Katy ciertos medicamentos, jack esteroides. ¿Cuáles son los síntomas? Tener azúcar rafael en la curly puede no causar ningún síntoma. O puede hacerle sentir mucha sed o mucha hambre. También puede orinar con más frecuencia de lo normal, tener visión borrosa o perder peso sin intentarlo. ¿Cómo se trata el azúcar rafael en la curly? Usted puede ivana medidas para reducir adams nivel de azúcar en la curly si entiende lo que lo eleva. Adams médico puede desear que usted aprenda a revisarse el nivel de azúcar en la curly en casa. Entonces puede dony cómo la enfermedad, el estrés o diferentes tipos de alimentos o medicamentos aumentan o disminuyen adams nivel de azúcar en la curly. Pueden ser necesarias otras pruebas para dony si tiene diabetes. ¿Cómo se puede prevenir el azúcar rafael en la curly? · Controle adams peso. Si tiene sobrepeso, bajar solo un poco de peso puede [place] danial. Reducir la grasa alrededor de adams cintura es lo más importante.   · Limite la cantidad de calorías, dulces y grasas poco saludables que come. Pregúntele a adams médico si un dietista puede ayudarle. Un dietista registrado puede ayudarle a elaborar planes de alimentación que se adapten a adams estilo de roxann. · Deric al menos 30 minutos de ejercicio la mayoría de los días de la [place]. El ejercicio ayuda a controlar el azúcar en la curly. También ayuda a mantener un peso saludable. Caminar es rafa buena opción. Es posible que también quiera hacer otras actividades, jack correr, nadar, [employer], o jugar tenis o deportes de equipo. · Si adams médico le recetó medicamentos, tómelos exactamente jack se le indicó. Llame a adams médico si dru que está teniendo un problema con adams medicamento. Recibirá [employer] medicamentos específicos recetados por adams médico.  La atención de seguimiento es rafa parte clave de adams tratamiento y seguridad. Asegúrese de hacer y acudir a todas las citas, y llame a adams médico si está teniendo problemas. También es rafa buena idea saber los resultados de olvin exámenes y mantener rafa lista de los medicamentos que diane. ¿Dónde puede encontrar más información en inglés? Robert Christianson a http://deon-promise.info/. Escriba O108 en la búsqueda para aprender más acerca de \"Aprenda sobre el azúcar rafael en la curly - [ Learning About High Blood Sugar ]. \"  Revisado: 25 julio, 2018  Versión del contenido: 11.9  © 4174-5342 [employer]. Las instrucciones de cuidado fueron adaptadas bajo licencia por [employer] (which disclaims liability or warranty for this information). Si usted tiene [place] afección médica o sobre estas instrucciones, siempre pregunte a adams profesional de reid. [employer] niega toda garantía o responsabilidad por adams uso de esta información. Patient Education        Aprenda sobre los esteroides y el azúcar rafael en la curly - [ Learning About Steroids and High Blood Sugar ]  ¿Qué son los esteroides?     Noni Dempsey a menudo recetan medicamentos esteroideos (corticosteroides) para tratar muchas afecciones. Estos medicamentos ayudan a calmar la respuesta del cuerpo a la inflamación. Pueden tomarse para el asma, la EPOC, el dolor de espalda o las reacciones alérgicas. También se usan para otras afecciones jack las enfermedades autoinmunitarias, la artritis y ciertos tipos de cáncer. Estos medicamentos pueden administrarse en forma de pastillas o inyecciones. Los esteroides de los que se habla aquí no son el tipo de esteroides usados en fisicoculturismo. ¿Qué es un nivel alto de azúcar en la curly? El cuerpo [employer] alimentos que usted come en glucosa (azúcar), la cual [place] para obtener energía. Eso es algo brody. Arpit si adams cuerpo no puede usar el azúcar inmediatamente, esta puede acumularse en la curly y causar problemas. Cuando el azúcar en la curly sube a un nivel determinado, se llama azúcar rafael en la curly. También se conoce jack hiperglucemia. ¿Qué efecto pueden tener los esteroides en el azúcar en la curly? Los medicamentos esteroideos tienen muchos beneficios. Arpit un efecto secundario de los esteroides es que pueden elevar el nivel del azúcar en la curly mientra los diane. En la mayoría de [facility], esto es transitorio. Si ya tiene diabetes, miguel vez observe que los azúcares en la curly aumentan abruptamente después de ivana esteroides. En casos muy raros, ivana esteroides puede llevar a un nuevo diagnóstico de diabetes. ¿Cuáles son los síntomas de un nivel alto de azúcar en la curly? Los síntomas más comunes del nivel alto de azúcar en la curly incluyen:  · Sed. · Micción frecuente. · Pérdida de peso. · Nessa Garza. ¿Cómo se trata el azúcar rafael en la curly causada por los esteroides? Si adams médico piensa que usted pudiera tener el azúcar en la curly demasiado rafael, se le hará un análisis de [place].  Si tiene el azúcar en la curly a un nivel perjudicial, miguel vez necesite ivana un medicamento que le baje el azúcar en la curly. Después de que termine de [employer] esteroides, adams azúcar en la curly debería retornar a adams nivel habitual. En leslye momento, ya no necesitará ivana el medicamento. Si está tomando medicamentos para otra afección, es posible que adams médico le diga que cambie cómo diane leslye medicamento. La atención de seguimiento es rafa parte clave de adams tratamiento y seguridad. Asegúrese de hacer y acudir a todas las citas, y llame a adams médico si está teniendo problemas. También es rafa buena idea saber los resultados de olvin exámenes y mantener rafa lista de los medicamentos que diane. ¿Dónde puede encontrar más información en inglés? Dimitri Vieyra a http://deon-promise.info/. Escriba K280 en la búsqueda para aprender más acerca de \"Aprenda sobre los esteroides y el azúcar rafael en la curly - [ Learning About Steroids and High Blood Sugar ]. \"  Revisado: 14 marzo, 2018  Versión del contenido: 11.9  © 4120-6697 [employer]wise, Inc[employer]. Las instrucciones de cuidado fueron adaptadas bajo licencia por [employer] (which disclaims liability or warranty for this information). Si usted tiene [place] afección médica o sobre estas instrucciones, siempre pregunte a adams profesional de reid. [employer] Inc[employer] niega toda garantía o responsabilidad por adams uso de esta información.

## 2019-02-01 NOTE — PROGRESS NOTES
Subjective Chief complaint: blurry vision and increased urination Nicolas Walker is an 48 y.o. male HTN, T2DM, migraines here for evaluation of blurry vision and polyuria. Blurry vision: Started 4 days ago- associated with temporal headache, photosensitivity. Similar episode happened 2-3 years ago, was given glasses by ophtho at that time. Reports that she can't Polyruia:  Started approximately 10 days ago after his steroid shot. Voiding every 30 minutes. Sometimes accompanied by a burning pain. Polydipsia worsening. Never checks BG-currently on metformin and glipizide which he is compliant with.  
 
+fatigue, decreased appetite, chills. Denies chest pain, fever, n/v, dizziness. Allergies - reviewed: Allergies Allergen Reactions  Diovan [Valsartan] Other (comments) Boils & knots al over head & upper body  Diovan [Valsartan] Hives  Elavil Other (comments) Hallucinations. Seeing shadows and hearing voices.  Elavil Other (comments)  
  confusion  Statins-Hmg-Coa Reductase Inhibitors Other (comments) Medications - reviewed:  
Current Outpatient Medications Medication Sig  
 metoprolol tartrate (LOPRESSOR) 100 mg IR tablet Take  by mouth two (2) times a day.  metFORMIN (GLUCOPHAGE) 850 mg tablet Take  by mouth two (2) times daily (with meals).  glipiZIDE (GLUCOTROL) 5 mg tablet Take  by mouth two (2) times a day.  allopurinol (ZYLOPRIM) 100 mg tablet Take 1 Tab by mouth daily.  allopurinol (ZYLOPRIM) 300 mg tablet Take 300 mg by mouth daily.  lisinopril (PRINIVIL, ZESTRIL) 40 mg tablet Take 40 mg by mouth daily.  indomethacin (INDOCIN) 50 mg capsule  indomethacin (INDOCIN) 25 mg capsule 1-2 tid prn pain, take with food.  lisinopril (PRINIVIL, ZESTRIL) 40 mg tablet Take 1 Tab by mouth daily.   
 oxyCODONE-acetaminophen (PERCOCET) 5-325 mg per tablet Take 1 Tab by mouth every four (4) hours as needed for Pain. Max Daily Amount: 6 Tabs.  predniSONE (STERAPRED DS) 10 mg dose pack Take as directed.  carvedilol (COREG) 12.5 mg tablet Take 1 Tab by mouth two (2) times daily (with meals).  amLODIPine (NORVASC) 10 mg tablet Take  by mouth daily.  aspirin 81 mg chewable tablet Take 81 mg by mouth daily.  carvedilol (COREG) 6.25 mg tablet Take  by mouth two (2) times daily (with meals).  gemfibrozil (LOPID) 600 mg tablet Take 600 mg by mouth two (2) times a day.  methocarbamol (ROBAXIN) 750 mg tablet Take 1 Tab by mouth four (4) times daily. Indications: MUSCLE SPASM  amLODIPine (NORVASC) 10 mg tablet Take 1 Tab by mouth daily.  gemfibrozil (LOPID) 600 mg tablet Take 1 Tab by mouth two (2) times a day.  Aspirin, Buffered 81 mg tab Take  by mouth. No current facility-administered medications for this visit. Past Medical History - reviewed: 
Past Medical History:  
Diagnosis Date  Arthritis   
 gout  Gout  Heart disease  Hypercholesterolemia  Hypertension  Obstructive sleep apnea (adult) (pediatric) 11/30/2012  Other ill-defined conditions(799.89) LE varicosities, hyperlipidemia  Sciatica  Unspecified essential hypertension 11/30/2012 Immunizations - reviewed:  
Immunization History Administered Date(s) Administered  Influenza Vaccine 12/09/2008  Influenza Vaccine (Quad) PF 10/15/2013, 11/22/2014, 11/24/2015  Pneumococcal Polysaccharide (PPSV-23) 11/05/2013 ROS Review of Systems: See HPI. Physical Exam 
Visit Vitals /79 (BP 1 Location: Left arm, BP Patient Position: Sitting) Pulse (!) 106 Temp 98 °F (36.7 °C) (Oral) Resp 16 Ht 5' 6\" (1.676 m) Wt 235 lb (106.6 kg) SpO2 96% BMI 37.93 kg/m² General appearance - Alert, Squinting-appears photophobic. Head: Atraumatic. Normocephalic. No sinus tenderness. Eyes: EOMI. Sclera white. Respiratory - LCTAB. No wheeze/rale/rhonchi Heart - Normal rate, regular rhythm. No m/r/r Abdomen - Soft, non tender. Non distended. Neurological - CN II-XII grossly intact. No focal deficits. Speech normal.  
 
Assessment/Plan 1. Blurry vision: Acute onset blurry vision associated with temporal headaches. Differentials include complex migraine vs. CVA vs temporal arteritis. Will send to ER for further evaluation. 2. Polyuria: Hyperglycemia most likely 2/2 recent steroid injection. UA without any ketones. Further work-up in ER to rule out DKA. Also recommended pt follow up in clinic after the ER for further medication adjustment if necessary.  
- AMB POC GLUCOSE TEST 
- AMB POC URINALYSIS DIP STICK AUTO W/O MICRO 
- AMB POC HEMOGLOBIN A1C Follow-up Disposition: 
Return for follow up. I discussed the aforementioned diagnoses with the patient as well as the plan of care. Tammie Finn MD 
Family Medicine Resident PGY 3

## 2019-02-01 NOTE — PROGRESS NOTES
I reviewed with the resident the medical history and the resident's findings on the physical examination. I discussed with the resident the patient's diagnosis and concur with the plan. Pt with acute blurry vision and temporal headache. Gluc 402.

## 2019-02-01 NOTE — ED TRIAGE NOTES
[facility] [place]. Blur vision x 4 days. Steroid shot 9 days ago. PO pred 5 days. 373 BG.

## 2019-02-01 NOTE — ED PROVIDER NOTES
48 y.o. male with past medical history significant for arthritis, HTN, gout, hypercholesterolemia, heart disease, and sciatica who presents from [facility] for evaluation of blood glucose. Patient c/o bilateral blurry vision for past 4 days. He was seen at [address] [place] [address] today and his BG level read \"error\", so he was sent to ED for further evaluation. Patient had 5 days of Prednisone 4 weeks ago. He is not currently taking insulin. Specifically denies any other pain or symptoms. There are no other acute medical concerns at this time. Social hx: Former tobacco smoker (Quit 1990); Social EtOH use; Denies illicit drug use PCP: Korey Jaramillo MD 
 
Note written by Murtis Najjar, Scribe, as dictated by Harmeet Irvin MD 12:06 PM 
 
 
 
48 y.o. male with past medical history significant for HTN, hypercholesterolemia, heart disease, MCKENZIE, arthritis, gout, sciatica, s/p appendectomy, who presents ambulatory to the ED accompanied by spouse, with chief complaint of high blood sugar and blurred vision. Pt complains of constant bilateral \"blurry\" vision that started ~4 days ago. He states that he is normally far-sighted and wears corrective eyeglasses, but he states that he now finds things that are far to be blurry and has less difficulty seeing close objects. Denies seeing an optometrist recently. He believes that his blurry vision is due to \"high\" blood sugar, which was measured at [address] [place] [address] earlier today. Pt also complains of generalized weakness, nausea, chills and lack of appetite. He notes that he had a \"cold\" last week which he treated with OTC medications. Pt also reports that he recently finished with steroids that he was taking for gout and left knee swelling, s/p fluid removal. Pt specifically denies headache, cough or chest pain. There are no other acute medical concerns at this time. Social hx: Tobacco use (former smoker, quit date: 12/16/1990);  Positive for EtOH use (socially); Negative for Illicit Drug use PCP: Korey Jaramillo MD 
 
Note written by Ally Lilly, as dictated by Alvarado Ashby MD 12:46 PM 
 
 
The history is provided by the patient and the spouse. No  was used. Past Medical History:  
Diagnosis Date  Arthritis   
 gout  Gout  Heart disease  Hypercholesterolemia  Hypertension  Obstructive sleep apnea (adult) (pediatric) 2012  Other ill-defined conditions(799.89) LE varicosities, hyperlipidemia  Sciatica  Unspecified essential hypertension 2012 Past Surgical History:  
Procedure Laterality Date  HX APPENDECTOMY  HX ORTHOPAEDIC    
 L gt toe Family History:  
Problem Relation Age of Onset  Diabetes Mother  Hypertension Mother Social History Socioeconomic History  Marital status:  Spouse name: Not on file  Number of children: Not on file  Years of education: Not on file  Highest education level: Not on file Social Needs  Financial resource strain: Not on file  Food insecurity - worry: Not on file  Food insecurity - inability: Not on file  Transportation needs - medical: Not on file  Transportation needs - non-medical: Not on file Occupational History  Not on file Tobacco Use  Smoking status: Former Smoker Types: Cigarettes Last attempt to quit: 1990 Years since quittin.1  Smokeless tobacco: Never Used Substance and Sexual Activity  Alcohol use: Yes Alcohol/week: 0.0 oz  
  Comment: social  
 Drug use: No  
 Sexual activity: Yes  
  Partners: Female Other Topics Concern  Not on file Social History Narrative ** Merged History Encounter ** ALLERGIES: Diovan [valsartan]; Diovan [valsartan]; Elavil; Elavil; and Statins-hmg-coa reductase inhibitors Review of Systems Constitutional: Positive for appetite change and chills. Negative for diaphoresis, fatigue and fever. HENT: Negative for congestion, rhinorrhea, sinus pressure, sore throat, trouble swallowing and voice change. Eyes: Positive for visual disturbance. Negative for photophobia. Respiratory: Negative for cough, chest tightness and shortness of breath. Cardiovascular: Negative for chest pain, palpitations and leg swelling. Gastrointestinal: Positive for nausea. Negative for abdominal pain, blood in stool, constipation, diarrhea and vomiting. Musculoskeletal: Negative for arthralgias, myalgias and neck pain. Neurological: Positive for weakness (generalized). Negative for dizziness, light-headedness, numbness and headaches. All other systems reviewed and are negative. Vitals:  
 02/01/19 1210 02/01/19 1354 BP: (!) 156/101 (!) 127/100 Pulse: (!) 111 Resp: 14 Temp: 97.7 °F (36.5 °C) SpO2: 97% 97% Weight: 106.6 kg (235 lb) Physical Exam  
Constitutional: He is oriented to person, place, and time. He appears well-developed and well-nourished. No distress. HENT:  
Head: Normocephalic and atraumatic. Nose: Nose normal.  
Mouth/Throat: Oropharynx is clear and moist. No oropharyngeal exudate. Eyes: Conjunctivae and EOM are normal. Right eye exhibits no discharge. Left eye exhibits no discharge. No scleral icterus. Neck: Normal range of motion. Neck supple. No JVD present. No tracheal deviation present. No thyromegaly present. Cardiovascular: Normal rate, regular rhythm, normal heart sounds and intact distal pulses. Exam reveals no gallop and no friction rub. No murmur heard. Pulmonary/Chest: Effort normal and breath sounds normal. No stridor. No respiratory distress. He has no wheezes. He has no rales. He exhibits no tenderness. Abdominal: Bowel sounds are normal. He exhibits no distension and no mass. There is no tenderness. There is no rebound. Musculoskeletal: Normal range of motion. He exhibits no edema or tenderness. Lymphadenopathy:  
  He has no cervical adenopathy. Neurological: He is alert and oriented to person, place, and time. No cranial nerve deficit. Coordination normal.  
Skin: Skin is warm and dry. No rash noted. He is not diaphoretic. No erythema. No pallor. Psychiatric: He has a normal mood and affect. His behavior is normal. Judgment and thought content normal.  
Nursing note and vitals reviewed. Note written by Ally Lilly, as dictated by Alvarado Ashby MD 12:46 PM 
 
MDM Number of Diagnoses or Management Options Hyperglycemia:  
Diagnosis management comments: A/P:  Steroid induced hperglycemia. Cbc, cmp, ua, vbg, 10 U Insulin, 1 L NS. Amount and/or Complexity of Data Reviewed Clinical lab tests: ordered and reviewed Independent visualization of images, tracings, or specimens: yes Risk of Complications, Morbidity, and/or Mortality Presenting problems: moderate Diagnostic procedures: moderate Management options: moderate Patient Progress Patient progress: improved Procedures

## 2019-02-01 NOTE — PATIENT INSTRUCTIONS
Vista reducida: Instrucciones de cuidado - [ Reduced Vision: Care Instructions ] Instrucciones de cuidado La vista reducida puede ser causada por muchas cosas. Estas incluyen la degeneración macular y el glaucoma. Cuando usted no puede dony retana tiffanie jack antes, la roxann cotidiana puede resultar más difícil. Arpit usted puede hacer algunas cosas para mantenerse independiente y seguir haciendo las actividades que disfruta. La atención de seguimiento es rafa parte clave de adams tratamiento y seguridad. Asegúrese de hacer y acudir a todas las citas, y llame a adams médico si está teniendo problemas. También es rafa buena idea saber los resultados de olvin exámenes y mantener rafa lista de los medicamentos que diane. Cómo puede cuidarse en el hogar? Uriel Loya · Dirija la iluminación a lo que quiere dony. No la dirija a los ojos. · Añada lámparas donde necesite iluminación adicional. 
· Use edgar o persianas para ajustar el nivel de iluminación natural. 
· Use buena iluminación en lugares donde pueda caerse con facilidad.  [address] entradas y escaleras. [facility] · Etiquete cualquier cosa que sea difícil de reconocer o que se pueda confundir con otra cosa. [place] puede incluir medicamentos, especias y alimentos. Use letras en giovanni sobre un fondo machuca. O puede codificar los artículos con colores. · [address] de los valores de temperatura que más [place] en adams cocina ([place]) y horno. [address] \"on\" (encendido) y \"off\" (apagado). · Harris las temperaturas del agua que [place] en los grifos en la cocina y el baño. Para evitar que un fregadero o rafa reid (bañera) se desborden, harris el nivel de agua que desee con tiras de cinta adhesiva o marcadores resistentes al agua. Evite caídas en adams hogar · Sustituya o elimine cualquier alfombra desgastada. Pegue al piso con cinta adhesiva o elimine los tapetes o alfombrillas. · No encere los pisos. En pisos lisos, use productos de limpieza antideslizantes y que no brillen. · Elimine los cables eléctricos de las áreas donde tiene que caminar. O péguelos al piso con cinta adhesiva para que no tropiece con ellos. · Asegúrese de que los muebles no sobresalgan en las áreas donde camina. Mantenga las taya arrimadas a mesas y escritorios. Mantenga cerrados todos los cajones. · [address] demetra ya sea totalmente abiertas o totalmente cerradas. No las deje abiertas o cerradas a medias. · Use los pasamanos en escaleras y rampas. Asegúrese de que se extiendan más allá de los extremos superior e inferior de las escaleras. De scott modo, evitará tropezar si olvida un Yesenia Decant. Ayúdese con tecnología · Use rafa lupa. Puede comprar lupas portátiles de mano. O puede comprar otras que se ajustan a las gafas. Algunas tienen luces incorporadas. · Si adams presupuesto se lo permite, miguel vez le convenga considerar un sistema de ampliación de video. Estos sistemas pueden ampliar algo impreso, imágenes u otros artículos en rafa pantalla. · Si tiene rafa computadora: ? Trate de ajustar la pantalla. A menudo se puede cambiar el tamaño del texto y [address] imágenes. De leslye modo, serán más fáciles de dony y leer. ? Miguel vez pueda probar programas informáticos (software) especiales. Ciertos programas pueden reconocer órdenes habladas o convertir un dictado en texto. Otros programas informáticos permiten a las computadoras emitir texto hablado y leer documentos. · Use artículos con letras grandes. Estos incluyen libros, periódicos, revistas y etiquetas de [facility]. También puede escuchar grabaciones de libros. 
· Piense en usar dispositivos hechos para gente con vista deficiente. Los ejemplos incluyen relojes y relojes de pulsera que anuncian la hora. Leitha [place] relojes, teléfonos y calculadoras con teclas especialmente grandes. Manténgase seguro mientras hace actividad · Pregúntele a adams médico qué actividades físicas son seguras para usted. Si usted se inclina, levanta objetos pesados o se mueve con rapidez, esto puede afectar adams reid o adams vista. · Pídale a un amigo que le lisbet las instrucciones de un nuevo ejercicio y que compruebe adams técnica. · Camine con alguien que pueda ayudarle a dony cosas que pudieran presentar un peligro. · Si hace largos, utilice rafa piscina que tenga cuerdas para separar las sobeida. Cuándo debe pedir ayuda? Preste especial atención a los cambios en adams reid y asegúrese de comunicarse con adams médico si: 
  · Tiene cambios en la vista. Dónde puede encontrar más información en inglés? [facility] a http://deon-promise.info/. Escriba Q620 en la búsqueda para aprender Duran Stable de \"Vista reducida: Instrucciones de cuidado - [ Reduced Vision: Care Instructions ]. \" 
Revisado: 17 julio, 2018 Versión del contenido: 11.9 © 4974-4805 Healthwise, Incorporated. Las instrucciones de cuidado fueron adaptadas bajo licencia por [employer] (which disclaims liability or warranty for this information). Si usted tiene Bergen Dubberly afección médica o sobre estas instrucciones, siempre pregunte a adams profesional de reid. [place] [employer], [employer] niega toda garantía o responsabilidad por adams uso de esta información.

## 2019-02-26 NOTE — PROGRESS NOTES
Karina Markham is a 48 y.o. male here today to address the following issues: Chief Complaint Patient presents with  Knee Pain  
  follow up States knee pain is better since injection. 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 No longer with effusion. Only one gout flair a year for the past 2 years. Also complaining blurry vision. Blurry vision started a few weeks ago. Recently went to hospital for hyperglycemia. Past Medical History:  
Diagnosis Date  Arthritis   
 gout  Gout  Heart disease  Hypercholesterolemia  Hypertension  Obstructive sleep apnea (adult) (pediatric) 2012  Other ill-defined conditions(799.89) LE varicosities, hyperlipidemia  Sciatica  Unspecified essential hypertension 2012 Past Surgical History:  
Procedure Laterality Date  HX APPENDECTOMY  HX ORTHOPAEDIC    
 L gt toe Social History Socioeconomic History  Marital status:  Spouse name: Not on file  Number of children: Not on file  Years of education: Not on file  Highest education level: Not on file Social Needs  Financial resource strain: Not on file  Food insecurity - worry: Not on file  Food insecurity - inability: Not on file  Transportation needs - medical: Not on file  Transportation needs - non-medical: Not on file Occupational History  Not on file Tobacco Use  Smoking status: Former Smoker Types: Cigarettes Last attempt to quit: 1990 Years since quittin.2  Smokeless tobacco: Never Used Substance and Sexual Activity  Alcohol use: Yes Alcohol/week: 0.0 oz  
  Comment: social  
 Drug use: No  
 Sexual activity: Yes  
  Partners: Female Other Topics Concern  Not on file Social History Narrative ** Merged History Encounter ** Allergies Allergen Reactions  Diovan [Valsartan] Other (comments) Boils & knots al over head & upper body  Diovan [Valsartan] Hives  Elavil Other (comments) Hallucinations. Seeing shadows and hearing voices.  Elavil Other (comments)  
  confusion  Statins-Hmg-Coa Reductase Inhibitors Other (comments) Current Outpatient Medications Medication Sig  
 metoprolol tartrate (LOPRESSOR) 100 mg IR tablet Take  by mouth two (2) times a day.  metFORMIN (GLUCOPHAGE) 850 mg tablet Take  by mouth two (2) times daily (with meals).  glipiZIDE (GLUCOTROL) 5 mg tablet Take  by mouth two (2) times a day.  allopurinol (ZYLOPRIM) 300 mg tablet Take 300 mg by mouth daily.  lisinopril (PRINIVIL, ZESTRIL) 40 mg tablet Take 40 mg by mouth daily.  indomethacin (INDOCIN) 50 mg capsule  indomethacin (INDOCIN) 25 mg capsule 1-2 tid prn pain, take with food.  lisinopril (PRINIVIL, ZESTRIL) 40 mg tablet Take 1 Tab by mouth daily. No current facility-administered medications for this visit. Review of Systems Constitutional: Negative for malaise/fatigue. Respiratory: Negative for shortness of breath. Cardiovascular: Negative for chest pain. Gastrointestinal: Negative for abdominal pain. A comprehensive review of systems was negative except for that written in the HPI and listed above. Visit Vitals /87 (BP 1 Location: Right arm, BP Patient Position: Sitting) Pulse 82 Temp 98.2 °F (36.8 °C) (Oral) Resp 16 Ht 5' 6\" (1.676 m) Wt 242 lb (109.8 kg) SpO2 96% BMI 39.06 kg/m² Physical Exam 
Gen: Well appearing. No apparent distress. Alert and oriented. Responds to all questions appropriately. Lungs: No labored respirations. Talking in complete sentences without difficulty. Musculoskeletal: 
Knee: LEFT/ 
Knee Effusion: None Quadriceps atrophy: None Popliteal (Bakers) Cyst: Negative Patellofemoral crepitus: Positive ROM: 
Flexion: 130 Extension: 0 Hip IR: FROM without pain Hip ER:  FROM without pain Alignment: 
Patellar tracking: +2 
 
 Dynamic Test: 
Gait: Normal  
Assistive devices: None Elian Test: Negative Palpation:  
Patella tenderness: None Patellar tendon tenderness: None Quad tendon tenderness: None Medial joint line tenderness: None Lateral joint line tenderness: None MCL tenderness: None LCL tenderness: None Medial facet tenderness: None Lateral facet tenderness: None Condyle tenderness: None Tibia tubercle tenderness: None Proximal fibula tenderness: None Plica tenderness: None Prepatellar bursa tenderness: None Pes bursa tenderness: None ITB tenderness: None Ligament/Meniscal Exam: 
Patellar Grind: Negative Patellar apprehension (medial/lateral): Negative Lachman: Negative, with good endpoint Anterior Drawer: Negative Posterior Drawer: Negative Valgus stress: Negative with good endpoint Varus stress: Negative with good endpoint Ap: Negative Strength (0-5/5): Flexion: Left: 5/5    Right: 5/5 Extension: Left: 5/5    Right: 5/5 Hip abduction: 5/5 Hip adduction: 5/5 Neuro/Vascular : Pulses intact, no edema, and neurologically intact . Skin: No obvious rash or skin breakdown. No results found for this or any previous visit (from the past 12 hour(s)). 1. Gout, unspecified cause, unspecified chronicity, unspecified site 
-Will get labs and adjust allopurinol as needed. Continue gout diet 
- URIC ACID 
- CBC WITH AUTOMATED DIFF 
- METABOLIC PANEL, COMPREHENSIVE 2. Blurry vision, bilateral 
Refer to Oph. Also never had DM retinal screening 
- REFERRAL TO OPHTHALMOLOGY 3. Hyperglycemia 
-Check A1C 
- HEMOGLOBIN A1C WITH EAG 4. Controlled diabetes mellitus type 2 with complications, unspecified whether long term insulin use ([address] -Follow up in 1 month for Dm 5. Osteoarthritis of left knee, unspecified osteoarthritis type 
-Continue silver sneakers, refer to PT 
- REFERRAL TO PHYSICAL THERAPY Patient encounter was at least 25 minutes with more than 50 percent of the visit involved in counseling Follow-up Disposition: 
Return in about 1 month (around 3/27/2019). Mary Ann El MD, CAQSM, RMSK

## 2019-02-27 ENCOUNTER — OFFICE VISIT (OUTPATIENT)
Dept: FAMILY MEDICINE CLINIC | Age: 54
End: 2019-02-27

## 2019-02-27 VITALS
RESPIRATION RATE: 16 BRPM | SYSTOLIC BLOOD PRESSURE: 135 MMHG | HEART RATE: 82 BPM | TEMPERATURE: 98.2 F | OXYGEN SATURATION: 96 % | BODY MASS INDEX: 38.89 KG/M2 | HEIGHT: 66 IN | WEIGHT: 242 LBS | DIASTOLIC BLOOD PRESSURE: 87 MMHG

## 2019-02-27 DIAGNOSIS — H53.8 BLURRY VISION, BILATERAL: ICD-10-CM

## 2019-02-27 DIAGNOSIS — M17.12 OSTEOARTHRITIS OF LEFT KNEE, UNSPECIFIED OSTEOARTHRITIS TYPE: ICD-10-CM

## 2019-02-27 DIAGNOSIS — M10.9 GOUT, UNSPECIFIED CAUSE, UNSPECIFIED CHRONICITY, UNSPECIFIED SITE: Primary | ICD-10-CM

## 2019-02-27 DIAGNOSIS — E11.8 CONTROLLED DIABETES MELLITUS TYPE 2 WITH COMPLICATIONS, UNSPECIFIED WHETHER LONG TERM INSULIN USE (HCC): ICD-10-CM

## 2019-02-27 DIAGNOSIS — R73.9 HYPERGLYCEMIA: ICD-10-CM

## 2019-02-27 NOTE — PROGRESS NOTES
Identified Patient with two Patient identifiers (Name and ). Two Patient Identifiers confirmed. Reviewed record in preparation for visit and have obtained necessary documentation. Chief Complaint Patient presents with  Knee Pain  
  follow up Visit Vitals /87 (BP 1 Location: Right arm, BP Patient Position: Sitting) Pulse 82 Temp 98.2 °F (36.8 °C) (Oral) Resp 16 Ht 5' 6\" (1.676 m) Wt 242 lb (109.8 kg) SpO2 96% BMI 39.06 kg/m² 1. Have you been to the ER, urgent care clinic since your last visit? Hospitalized since your last visit? No 
 
2. Have you seen or consulted any other health care providers outside of the [facility] since your last visit? Include any pap smears or colon screening.  No

## 2019-02-27 NOTE — PATIENT INSTRUCTIONS
Gout: Care Instructions Your Care Instructions Gout is a form of arthritis caused by a buildup of uric acid crystals in a joint. It causes sudden attacks of pain, swelling, redness, and stiffness, usually in one joint, especially the big toe. Gout usually comes on without a cause. But it can be brought on by drinking alcohol (especially beer) or eating seafood and red meat. Taking certain medicines, such as diuretics or aspirin, also can bring on an attack of gout. Taking your medicines as prescribed and following up with your doctor regularly can help you avoid gout attacks in the future. Follow-up care is a key part of your treatment and safety. Be sure to make and go to all appointments, and call your doctor if you are having problems. It's also a good idea to know your test results and keep a list of the medicines you take. How can you care for yourself at home? · If the joint is swollen, put ice or a cold pack on the area for 10 to 20 minutes at a time. Put a thin cloth between the ice and your skin. · Prop up the sore limb on a pillow when you ice it or anytime you sit or lie down during the next 3 days. Try to keep it above the level of your heart. This will help reduce swelling. · Rest sore joints. Avoid activities that put weight or strain on the joints for a few days. Take short rest breaks from your regular activities during the day. · Take your medicines exactly as prescribed. Call your doctor if you think you are having a problem with your medicine. · Take pain medicines exactly as directed. ? If the doctor gave you a prescription medicine for pain, take it as prescribed. ? If you are not taking a prescription pain medicine, ask your doctor if you can take an over-the-counter medicine. · Eat less seafood and red meat. · Check with your doctor before drinking alcohol. · Losing weight, if you are overweight, may help reduce attacks of gout. But do not go on a [employer]. \" Losing a lot of weight in a short amount of time can cause a gout attack. When should you call for help? Call your doctor now or seek immediate medical care if: 
  · You have a fever.  
  · The joint is so painful you cannot use it.  
  · You have sudden, unexplained swelling, redness, warmth, or severe pain in one or more joints.  
 Watch closely for changes in your health, and be sure to contact your doctor if: 
  · You have joint pain.  
  · Your symptoms get worse or are not improving after 2 or 3 days. Where can you learn more? Go to http://deon-promise.info/. Enter L623 in the search box to learn more about \"Gout: Care Instructions. \" Current as of: Renee 10, 2018 Content Version: 11.9 © 9460-2601 [employer]. Care instructions adapted under license by [employer] (which disclaims liability or warranty for this information). If you have questions about a medical condition or this instruction, always ask your healthcare professional. [place] [address] any warranty or liability for your use of this information. Dieta restringida en purinas: Instrucciones de cuidado - [ Purine-Restricted Diet: Care Instructions ] Instrucciones de cuidado Abigail [place] son sustancias que se encuentran en ciertos alimentos. Adams cuerpo transforma las purinas en ácido úrico. Un nivel alto de ácido úrico puede causar gota, rafa forma de artritis que provoca dolor e inflamación en las articulaciones. Usted podría ayudar a controlar la cantidad de ácido úrico en adams cuerpo limitando los alimentos con alto contenido en purinas de adams Cheryal Sandra. La atención de seguimiento es rafa parte clave de adams tratamiento y seguridad. Asegúrese de hacer y acudir a todas las citas, y llame a adams médico si está teniendo problemas. También es rafa buena idea saber los resultados de olvin exámenes y mantener rafa lista de los medicamentos que diane. Cómo puede cuidarse en el hogar? · Planifique olvin comidas y refrigerios con alimentos que negrito bajos en purinas y seguros para usted. Estos alimentos incluyen: ? Verduras verdes y tomates. ? Frutas. ? Malin, arroz y cereales integrales. ? Huevos, mantequilla de cacahuate (maní) y nueces. ? [place] y otros productos lácteos semidescremados. ? Palomitas de maíz (\"popcorn\"). ? Postres de gelatina, chocolate, cacao, así jack tartas y dulces en pequeñas cantidades. · Puede comer alimentos que negrito medianamente ricos en purinas, vanessa solo de vez en cuando. Estos alimentos incluyen: 
? Legumbres, jack frijoles (habichuelas) secos y arvejas (chícharos) secas. Puede comer 1 taza de legumbres cocidas al día. ? Espárragos, coliflor, espinacas, hongos y arvejas (chícharos). ? Pescados y mariscos (que no negrito mariscos muy ricos en purinas). ? Ayush, salvado de robert y germen de [place]. · Limite los alimentos muy ricos en purinas jack: 
? Vísceras, jack hígado, riñones, mollejas y sesos. ? [place], incluyendo tocino, res, cerdo y arevalo. ? [place] de animales de caza y cualquier otro tipo de carne en grandes cantidades. ? Milady Rubiine, arenque, caballa y vieiras. ? Salsa de carne (\"gravy\"). ? Verl Alvarez. Dónde puede encontrar más información en inglés? Viviana anand http://deon-promise.info/. Escriba F448 en la búsqueda para aprender más acerca de \"Dieta restringida en purinas: Instrucciones de cuidado - [ Purine-Restricted Diet: Care Instructions ]. \" 
Revisado: 28 marzo, 2018 Versión del contenido: 11.9 © 1346-8667 Healthwise, Incorporated. Las instrucciones de cuidado fueron adaptadas bajo licencia por [employer] (which disclaims liability or warranty for this information). Si usted tiene Emanuel Casar afección médica o sobre estas instrucciones, siempre pregunte a adams profesional de reid.  Healthwise, Incorporated niega toda garantía o responsabilidad por adams uso de esta información.

## 2019-02-28 ENCOUNTER — TELEPHONE (OUTPATIENT)
Dept: FAMILY MEDICINE CLINIC | Age: 54
End: 2019-02-28

## 2019-02-28 LAB
ALBUMIN SERPL-MCNC: 4 G/DL (ref 3.5–5.5)
ALBUMIN/GLOB SERPL: 1.4 {RATIO} (ref 1.2–2.2)
ALP SERPL-CCNC: 99 IU/L (ref 39–117)
ALT SERPL-CCNC: 26 IU/L (ref 0–44)
AST SERPL-CCNC: 28 IU/L (ref 0–40)
BASOPHILS # BLD AUTO: 0 X10E3/UL (ref 0–0.2)
BASOPHILS NFR BLD AUTO: 0 %
BILIRUB SERPL-MCNC: 0.7 MG/DL (ref 0–1.2)
BUN SERPL-MCNC: 5 MG/DL (ref 6–24)
BUN/CREAT SERPL: 6 (ref 9–20)
CALCIUM SERPL-MCNC: 9.2 MG/DL (ref 8.7–10.2)
CHLORIDE SERPL-SCNC: 102 MMOL/L (ref 96–106)
CO2 SERPL-SCNC: 21 MMOL/L (ref 20–29)
CREAT SERPL-MCNC: 0.85 MG/DL (ref 0.76–1.27)
EOSINOPHIL # BLD AUTO: 0.3 X10E3/UL (ref 0–0.4)
EOSINOPHIL NFR BLD AUTO: 3 %
ERYTHROCYTE [DISTWIDTH] IN BLOOD BY AUTOMATED COUNT: 13.2 % (ref 12.3–15.4)
EST. AVERAGE GLUCOSE BLD GHB EST-MCNC: 266 MG/DL
GLOBULIN SER CALC-MCNC: 2.8 G/DL (ref 1.5–4.5)
GLUCOSE SERPL-MCNC: 205 MG/DL (ref 65–99)
HBA1C MFR BLD: 10.9 % (ref 4.8–5.6)
HCT VFR BLD AUTO: 43.2 % (ref 37.5–51)
HGB BLD-MCNC: 13.9 G/DL (ref 13–17.7)
IMM GRANULOCYTES # BLD AUTO: 0 X10E3/UL (ref 0–0.1)
IMM GRANULOCYTES NFR BLD AUTO: 0 %
LYMPHOCYTES # BLD AUTO: 3.5 X10E3/UL (ref 0.7–3.1)
LYMPHOCYTES NFR BLD AUTO: 33 %
MCH RBC QN AUTO: 30.6 PG (ref 26.6–33)
MCHC RBC AUTO-ENTMCNC: 32.2 G/DL (ref 31.5–35.7)
MCV RBC AUTO: 95 FL (ref 79–97)
MONOCYTES # BLD AUTO: 0.7 X10E3/UL (ref 0.1–0.9)
MONOCYTES NFR BLD AUTO: 7 %
NEUTROPHILS # BLD AUTO: 6.2 X10E3/UL (ref 1.4–7)
NEUTROPHILS NFR BLD AUTO: 57 %
PLATELET # BLD AUTO: 361 X10E3/UL (ref 150–379)
POTASSIUM SERPL-SCNC: 4.9 MMOL/L (ref 3.5–5.2)
PROT SERPL-MCNC: 6.8 G/DL (ref 6–8.5)
RBC # BLD AUTO: 4.54 X10E6/UL (ref 4.14–5.8)
SODIUM SERPL-SCNC: 140 MMOL/L (ref 134–144)
URATE SERPL-MCNC: 8.7 MG/DL (ref 3.7–8.6)
WBC # BLD AUTO: 10.8 X10E3/UL (ref 3.4–10.8)

## 2019-03-04 ENCOUNTER — OFFICE VISIT (OUTPATIENT)
Dept: FAMILY MEDICINE CLINIC | Age: 54
End: 2019-03-04

## 2019-03-04 VITALS
RESPIRATION RATE: 18 BRPM | HEIGHT: 66 IN | DIASTOLIC BLOOD PRESSURE: 80 MMHG | WEIGHT: 240 LBS | BODY MASS INDEX: 38.57 KG/M2 | HEART RATE: 79 BPM | TEMPERATURE: 97.8 F | OXYGEN SATURATION: 97 % | SYSTOLIC BLOOD PRESSURE: 120 MMHG

## 2019-03-04 DIAGNOSIS — Z23 ENCOUNTER FOR IMMUNIZATION: ICD-10-CM

## 2019-03-04 DIAGNOSIS — E11.65 UNCONTROLLED TYPE 2 DIABETES MELLITUS WITH HYPERGLYCEMIA (HCC): Primary | ICD-10-CM

## 2019-03-04 DIAGNOSIS — I42.0 CARDIOMYOPATHY, DILATED, NONISCHEMIC (HCC): ICD-10-CM

## 2019-03-04 DIAGNOSIS — Z11.59 ENCOUNTER FOR HEPATITIS C SCREENING TEST FOR LOW RISK PATIENT: ICD-10-CM

## 2019-03-04 DIAGNOSIS — E78.5 DYSLIPIDEMIA: ICD-10-CM

## 2019-03-04 DIAGNOSIS — M87.052 AVASCULAR NECROSIS OF BONE OF LEFT HIP (HCC): ICD-10-CM

## 2019-03-04 DIAGNOSIS — I10 ESSENTIAL HYPERTENSION: ICD-10-CM

## 2019-03-04 LAB
ALBUMIN UR QL STRIP: 10 MG/L
CREATININE, URINE POC: 100 MG/DL
MICROALBUMIN/CREAT RATIO POC: <30 MG/G

## 2019-03-04 RX ORDER — METFORMIN HYDROCHLORIDE 500 MG/1
500 TABLET ORAL 2 TIMES DAILY WITH MEALS
Qty: 180 TAB | Refills: 1 | Status: SHIPPED | OUTPATIENT
Start: 2019-03-04 | End: 2020-02-11 | Stop reason: ALTCHOICE

## 2019-03-04 RX ORDER — EZETIMIBE 10 MG/1
10 TABLET ORAL DAILY
Qty: 30 TAB | Refills: 0 | Status: SHIPPED | OUTPATIENT
Start: 2019-03-04 | End: 2019-03-04 | Stop reason: SDUPTHER

## 2019-03-04 RX ORDER — EZETIMIBE 10 MG/1
10 TABLET ORAL DAILY
Qty: 90 TAB | Refills: 1 | Status: SHIPPED | OUTPATIENT
Start: 2019-03-04 | End: 2020-02-11 | Stop reason: SDUPTHER

## 2019-03-04 RX ORDER — METFORMIN HYDROCHLORIDE 500 MG/1
500 TABLET ORAL 2 TIMES DAILY WITH MEALS
Qty: 60 TAB | Refills: 0 | Status: SHIPPED | OUTPATIENT
Start: 2019-03-04 | End: 2019-03-04 | Stop reason: SDUPTHER

## 2019-03-04 RX ORDER — LISINOPRIL 40 MG/1
40 TABLET ORAL DAILY
Qty: 90 TAB | Refills: 1 | Status: SHIPPED | OUTPATIENT
Start: 2019-03-04 | End: 2020-02-11 | Stop reason: SDUPTHER

## 2019-03-04 RX ORDER — GLIPIZIDE 5 MG/1
5 TABLET ORAL 2 TIMES DAILY
Qty: 180 TAB | Refills: 1 | Status: SHIPPED | OUTPATIENT
Start: 2019-03-04 | End: 2019-04-01 | Stop reason: SDUPTHER

## 2019-03-04 RX ORDER — METOPROLOL TARTRATE 100 MG/1
100 TABLET ORAL 2 TIMES DAILY
Qty: 180 TAB | Refills: 1 | Status: SHIPPED | OUTPATIENT
Start: 2019-03-04 | End: 2020-02-11 | Stop reason: SDUPTHER

## 2019-03-04 RX ORDER — GLIPIZIDE 5 MG/1
5 TABLET ORAL 2 TIMES DAILY
Qty: 60 TAB | Refills: 0 | Status: SHIPPED | OUTPATIENT
Start: 2019-03-04 | End: 2019-03-04 | Stop reason: SDUPTHER

## 2019-03-04 NOTE — PATIENT INSTRUCTIONS
Call central scheduling at [phone] to schedule your heart ultrasound. Lara Edwards MD, [address] [facility] [address]  [address]  [place], [address] [facility]  Phone: [phone]  Fax: [phone] [facility]  [address]  [place], [address] [facility]  For Appointments:  [phone]  (Also have other locations--can book with same phone #.)        Learning About Diabetes Food Guidelines  Your Care Instructions    Meal planning is important to manage diabetes. It helps keep your blood sugar at a target level (which you set with your doctor). You don't have to eat special foods. You can eat what your family eats, including sweets once in a while. But you do have to pay attention to how often you eat and how much you eat of certain foods. You may want to work with a dietitian or a certified diabetes educator (CDE) to help you plan meals and snacks. A dietitian or CDE can also help you lose weight if that is one of your goals. What should you know about eating carbs? Managing the amount of carbohydrate (carbs) you eat is an important part of healthy meals when you have diabetes. Carbohydrate is found in many foods. · Learn which foods have carbs. And learn the amounts of carbs in different foods. ? Bread, cereal, pasta, and rice have about 15 grams of carbs in a serving. A serving is 1 slice of bread (1 ounce), ½ cup of cooked cereal, or 1/3 cup of cooked pasta or rice. ? Fruits have 15 grams of carbs in a serving. A serving is 1 small fresh fruit, such as an apple or orange; ½ of a banana; ½ cup of cooked or canned fruit; ½ cup of fruit juice; 1 cup of melon or raspberries; or 2 tablespoons of dried fruit. ? Milk and no-sugar-added yogurt have 15 grams of carbs in a serving. A serving is 1 cup of milk or 2/3 cup of no-sugar-added yogurt. ? Starchy vegetables have 15 grams of carbs in a serving.  A serving is ½ cup of mashed potatoes or sweet potato; 1 cup winter squash; ½ of a small baked potato; ½ cup of cooked beans; or ½ cup cooked corn or green peas. · Learn how much carbs to eat each day and at each meal. A dietitian or CDE can teach you how to keep track of the amount of carbs you eat. This is called carbohydrate counting. · If you are not sure how to count carbohydrate grams, use the Plate Method to plan meals. It is a good, quick way to make sure that you have a balanced meal. It also helps you spread carbs throughout the day. ? Divide your plate by types of foods. Put non-starchy vegetables on half the plate, meat or other protein food on one-quarter of the plate, and a grain or starchy vegetable in the final quarter of the plate. To this you can add a small piece of fruit and 1 cup of milk or yogurt, depending on how many carbs you are supposed to eat at a meal.  · Try to eat about the same amount of carbs at each meal. Do not \"save up\" your daily allowance of carbs to eat at one meal.  · Proteins have very little or no carbs per serving. Examples of proteins are beef, chicken, turkey, fish, eggs, tofu, cheese, cottage cheese, and peanut butter. A serving size of meat is 3 ounces, which is about the size of a deck of cards. Examples of meat substitute serving sizes (equal to 1 ounce of meat) are 1/4 cup of cottage cheese, 1 egg, 1 tablespoon of peanut butter, and ½ cup of tofu. How can you eat out and still eat healthy? · Learn to estimate the serving sizes of foods that have carbohydrate. If you measure food at home, it will be easier to estimate the amount in a serving of restaurant food. · If the meal you order has too much carbohydrate (such as potatoes, corn, or baked beans), ask to have a low-carbohydrate food instead. Ask for a salad or green vegetables. · If you use insulin, check your blood sugar before and after eating out to help you plan how much to eat in the future.   · If you eat more carbohydrate at a meal than you had planned, take a walk or do other exercise. This will help lower your blood sugar. What else should you know? · Limit saturated fat, such as the fat from meat and dairy products. This is a healthy choice because people who have diabetes are at higher risk of heart disease. So choose lean cuts of meat and nonfat or low-fat dairy products. Use olive or canola oil instead of butter or shortening when cooking. · Don't skip meals. Your blood sugar may drop too low if you skip meals and take insulin or certain medicines for diabetes. · Check with your doctor before you drink alcohol. Alcohol can cause your blood sugar to drop too low. Alcohol can also cause a bad reaction if you take certain diabetes medicines. Follow-up care is a key part of your treatment and safety. Be sure to make and go to all appointments, and call your doctor if you are having problems. It's also a good idea to know your test results and keep a list of the medicines you take. Where can you learn more? Go to http://deon-promise.info/. Enter J911 in the search box to learn more about \"Learning About Diabetes Food Guidelines. \"  Current as of: July 25, 2018  Content Version: 11.9  © 1894-4505 [employer], Incorporated. Care instructions adapted under license by [employer] (which disclaims liability or warranty for this information). If you have questions about a medical condition or this instruction, always ask your healthcare professional. [place] [address] any warranty or liability for your use of this information.

## 2019-03-04 NOTE — PROGRESS NOTES
I reviewed with the resident the medical history and the resident's findings on the physical examination. I discussed with the resident the patient's diagnosis and concur with the plan. DM: uncontrolled, on Metformin and Glipizide. Starting Jardiance, increasing Metformin. Follow up with cards.

## 2019-03-04 NOTE — PROGRESS NOTES
[address] [place] [address] with [address] [place] [address]     Chief Complaint: \"diabetes\"    [address] [facility] Emmanuelle Childs is an 48 y.o. male who presents for diabetes. Was seen for acute care visit on 2/27/19 for gout. Was noted to have elevated A1c. Questionaire: Diabetes Report Card   1) Have you seen the eye doctor in past year? No, referral placed at previous encounter. 2) How would you rate your Diabetic Diet? For breakfast he has salome, for lunch he has nothing, for dinner he has family meal.  Has snacks throughout the day day. Drinks 2 glasses of soda/day--grape/orange soda. 3) Have you seen diabetes education? Never   4) How well do you take care of your feet? Does well. 5) Last comprehensive foot exam? Over 1 year ago. 6) Do you keep your Primary Care Follow Up Appts? yes    7)  Last A1c:   Lab Results   Component Value Date/Time    Hemoglobin A1c 10.9 (H) 02/27/2019 12:26 PM      8) Do you know your A1C goal? no    9) Do you take your medications daily? yes    10) Do you check your blood sugars? no    11) Do you check your blood pressure? No   12) Have you gained weight? Yes, about 5 pound. 13) Do you follow an exercise program? no    14) Patient on ASA? No   15) Patient on Statin? No--unable to tolerate. 16) Patient on ACE-I/ARB?  Yes   17) Last Lipid Panel:  Lab Results   Component Value Date/Time    Cholesterol, total 220 (H) 05/27/2016 09:35 AM    HDL Cholesterol 34 (L) 05/27/2016 09:35 AM    LDL,Direct 148 (H) 11/11/2014 12:39 PM    LDL, calculated 134 (H) 05/27/2016 09:35 AM    VLDL, calculated 52 (H) 05/27/2016 09:35 AM    Triglyceride 261 (H) 05/27/2016 09:35 AM    CHOL/HDL Ratio 6.4 (H) 09/11/2015 10:05 AM      18) Last Urine Microalbumin:  Lab Results   Component Value Date/Time    ALBUMIN, URINE POC 10 03/04/2019 10:26 AM    CREATININE, URINE  03/04/2019 10:26 AM    Microalbumin/creat ratio (POC) <30 03/04/2019 10:26 AM      19) Do you have a cardiologist? No   20) Do you have a nephrologist? No   21) Have you received pneumococcal vaccine? Yes   22) Can you do better? yes        Also has a history of HTN and NICM. Previously followed with cardiology (in [facility]). Was told that he no longer needs to follow up with this group. Denies any chest pain or shortness of breath. Takes metoprolol tartrate only once/day. Unable to tolerate statin medications. Allergies - reviewed: Allergies   Allergen Reactions    Diovan [Valsartan] Other (comments)     Boils & knots al over head & upper body    Diovan [Valsartan] Hives    Elavil Other (comments)     Hallucinations. Seeing shadows and hearing voices.  Elavil Other (comments)     confusion    Statins-Hmg-Coa Reductase Inhibitors Other (comments)       Medications - reviewed:   Current Outpatient Medications   Medication Sig    glipiZIDE (GLUCOTROL) 5 mg tablet Take 1 Tab by mouth two (2) times a day.  empagliflozin (JARDIANCE) 10 mg tablet Take 1 Tab by mouth daily.  metFORMIN (GLUCOPHAGE) 500 mg tablet Take 1 Tab by mouth two (2) times daily (with meals).  lisinopril (PRINIVIL, ZESTRIL) 40 mg tablet Take 1 Tab by mouth daily.  metoprolol tartrate (LOPRESSOR) 100 mg IR tablet Take 1 Tab by mouth two (2) times a day.  varicella-zoster recombinant, PF, (SHINGRIX, PF,) 50 mcg/0.5 mL susr injection Give 1 dose now, second dose 2-6 months following dose 1.    diph,Pertuss,Acell,,Tet Vac-PF (ADACEL) 2 Lf-(2.5-5-3-5 mcg)-5Lf/0.5 mL susp 0.5 mL by IntraMUSCular route once for 1 dose.  ezetimibe (ZETIA) 10 mg tablet Take 1 Tab by mouth daily.  allopurinol (ZYLOPRIM) 300 mg tablet Take 300 mg by mouth daily.  indomethacin (INDOCIN) 25 mg capsule 1-2 tid prn pain, take with food. No current facility-administered medications for this visit.         I have reviewed and updated the histories as listed below:    Past Medical History - reviewed:  Past Medical History: Diagnosis Date    Arthritis     gout    Gout     Heart disease     Hypercholesterolemia     Hypertension     Obstructive sleep apnea (adult) (pediatric) 2012    Other ill-defined conditions(799.89)     LE varicosities, hyperlipidemia    Sciatica     Unspecified essential hypertension 2012         Past Surgical History - reviewed:   Past Surgical History:   Procedure Laterality Date    HX APPENDECTOMY      HX ORTHOPAEDIC      L gt toe         Social History - reviewed:  Social History     Socioeconomic History    Marital status:      Spouse name: Not on file    Number of children: Not on file    Years of education: Not on file    Highest education level: Not on file   Social Needs    Financial resource strain: Not on file    Food insecurity - worry: Not on file    Food insecurity - inability: Not on file   [employer] needs - medical: Not on file   [employer] needs - non-medical: Not on file   Occupational History    Not on file   Tobacco Use    Smoking status: Former Smoker     Types: Cigarettes     Last attempt to quit: 1990     Years since quittin.2    Smokeless tobacco: Never Used   Substance and Sexual Activity    Alcohol use: Yes     Alcohol/week: 0.0 oz     Comment: social    Drug use: No    Sexual activity: Yes     Partners: Female   Other Topics Concern    Not on file   Social History Narrative    ** Merged History Encounter **              Family History - reviewed:  Family History   Problem Relation Age of Onset    Diabetes Mother     Hypertension Mother          Immunizations - reviewed:   Immunization History   Administered Date(s) Administered    Influenza Vaccine 2008    Influenza Vaccine (Quad) PF 10/15/2013, 2014, 2015    Pneumococcal Polysaccharide (PPSV-23) 2013     Flu: declines    Review of Systems  Review of Systems   Constitutional: Negative for chills and fever. HENT: Negative for congestion. Respiratory: Negative for shortness of breath. Cardiovascular: Negative for chest pain. Gastrointestinal: Negative for abdominal distention, constipation, diarrhea, nausea and vomiting. Physical Exam    Visit Vitals  /80 (BP 1 Location: Right arm, BP Patient Position: Sitting)   Pulse 79   Temp 97.8 °F (36.6 °C) (Oral)   Resp 18   Ht 5' 6\" (1.676 m)   Wt 240 lb (108.9 kg)   SpO2 97%   BMI 38.74 kg/m²       Physical Exam   Constitutional: He is oriented to person, place, and time. He appears well-nourished. No distress. HENT:   Head: Normocephalic. Right Ear: Hearing and external ear normal.   Left Ear: Hearing and external ear normal.   Mouth/Throat: Oropharynx is clear and moist. No oropharyngeal exudate. Eyes: Conjunctivae and EOM are normal. Pupils are equal, round, and reactive to light. Right eye exhibits no discharge. Left eye exhibits no discharge. Neck: Normal range of motion. Neck supple. No thyromegaly present. Cardiovascular: Normal rate, regular rhythm, normal heart sounds and intact distal pulses. Exam reveals no gallop and no friction rub. No murmur heard. Pulmonary/Chest: Effort normal and breath sounds normal. No respiratory distress. He has no wheezes. He has no rales. He exhibits no tenderness. Abdominal: Soft. Bowel sounds are normal. He exhibits no distension and no mass. There is no tenderness. There is no rebound and no guarding. Musculoskeletal: Normal range of motion. He exhibits no edema. Lymphadenopathy:     He has no cervical adenopathy. Neurological: He is alert and oriented to person, place, and time. He has normal strength. No cranial nerve deficit. Normal monofilament exam.   Skin: Skin is warm and dry. He is not diaphoretic. No erythema. Psychiatric: He has a normal mood and affect. Nursing note and vitals reviewed. I personally reviewed stress echo from 7/2013. EF 30% at rest, 60% with activity.   Stress testing abnormal.  Findings compatible with cardiomyopathy with CHF. Recent Results (from the past 12 hour(s))   AMB POC URINE, MICROALBUMIN, SEMIQUANT (3 RESULTS)    Collection Time: 03/04/19 10:26 AM   Result Value Ref Range    ALBUMIN, URINE POC 10 Negative mg/L    CREATININE, URINE  mg/dL    Microalbumin/creat ratio (POC) <30 <30 MG/G       Assessment    ICD-10-CM ICD-9-CM    1. Uncontrolled type 2 diabetes mellitus with hyperglycemia (HCC) E11.65 250.02 AMB POC URINE, MICROALBUMIN, SEMIQUANT (3 RESULTS)      REFERRAL TO DIABETIC EDUCATION      LIPID PANEL      glipiZIDE (GLUCOTROL) 5 mg tablet      empagliflozin (JARDIANCE) 10 mg tablet      metFORMIN (GLUCOPHAGE) 500 mg tablet      lisinopril (PRINIVIL, ZESTRIL) 40 mg tablet      ezetimibe (ZETIA) 10 mg tablet       DIABETES FOOT EXAM      DISCONTINUED: empagliflozin (JARDIANCE) 10 mg tablet      DISCONTINUED: metFORMIN (GLUCOPHAGE) 500 mg tablet      DISCONTINUED: glipiZIDE (GLUCOTROL) 5 mg tablet      DISCONTINUED: ezetimibe (ZETIA) 10 mg tablet   2. Avascular necrosis of bone of left hip (HCC) M87.052 733.42    3. Cardiomyopathy, dilated, nonischemic (HCC) I42.0 425.4 REFERRAL TO CARDIOLOGY      ECHO ADULT COMPLETE      metoprolol tartrate (LOPRESSOR) 100 mg IR tablet   4. Encounter for hepatitis C screening test for low risk patient Z11.59 V73.89 HEPATITIS C AB   5. Essential hypertension I10 401.9 lisinopril (PRINIVIL, ZESTRIL) 40 mg tablet   6. Encounter for immunization Z23 V03.89 varicella-zoster recombinant, PF, (SHINGRIX, PF,) 50 mcg/0.5 mL susr injection      diph,Pertuss,Acell,,Tet Vac-PF (ADACEL) 2 Lf-(2.5-5-3-5 mcg)-5Lf/0.5 mL susp      DISCONTINUED: varicella-zoster recombinant, PF, (SHINGRIX, PF,) 50 mcg/0.5 mL susr injection      DISCONTINUED: diph,Pertuss,Acell,,Tet Vac-PF (ADACEL) 2 Lf-(2.5-5-3-5 mcg)-5Lf/0.5 mL susp   7. Dyslipidemia E78.5 272.4 ezetimibe (ZETIA) 10 mg tablet      DISCONTINUED: ezetimibe (ZETIA) 10 mg tablet     Plan  1.  Uncontrolled type 2 diabetes mellitus with hyperglycemia (HCC) - A1c out of control. Updating some labs today. Checking urine microalbumin. Unable to tolerate metformin 850 BID (only taking once/day). Will start him on metformin 500mg BID. Continue glipizide. Starting Jardiance. Will start zetia for poorly controlled cholesterol--unable to tolerate statin medications. - AMB POC URINE, MICROALBUMIN, SEMIQUANT (3 RESULTS)  - REFERRAL TO DIABETIC EDUCATION  - LIPID PANEL  - glipiZIDE (GLUCOTROL) 5 mg tablet; Take 1 Tab by mouth two (2) times a day. Dispense: 180 Tab; Refill: 1  - empagliflozin (JARDIANCE) 10 mg tablet; Take 1 Tab by mouth daily. Dispense: 90 Tab; Refill: 1  - metFORMIN (GLUCOPHAGE) 500 mg tablet; Take 1 Tab by mouth two (2) times daily (with meals). Dispense: 180 Tab; Refill: 1  - lisinopril (PRINIVIL, ZESTRIL) 40 mg tablet; Take 1 Tab by mouth daily. Dispense: 90 Tab; Refill: 1  - ezetimibe (ZETIA) 10 mg tablet; Take 1 Tab by mouth daily. Dispense: 90 Tab; Refill: 1    2. Avascular necrosis of bone of left hip (HCC) - scheduled to see PT per Dr. Benedetto Meigs.    3. Cardiomyopathy, dilated, nonischemic [facility]) - needs referral back to cardiology. Will get updated TTE. Needs to take metoprolol BID (unsure why he is on daily dosing). Will refill this for him.  - REFERRAL TO CARDIOLOGY  - ECHO ADULT COMPLETE; Future  - metoprolol tartrate (LOPRESSOR) 100 mg IR tablet; Take 1 Tab by mouth two (2) times a day. Dispense: 180 Tab; Refill: 1    4. Encounter for hepatitis C screening test for low risk patient  - HEPATITIS C AB    5. Essential hypertension - BP at goal today. - lisinopril (PRINIVIL, ZESTRIL) 40 mg tablet; Take 1 Tab by mouth daily. Dispense: 90 Tab; Refill: 1    6. Encounter for immunization - shingrix and Tdap scripts given today. - varicella-zoster recombinant, PF, (SHINGRIX, PF,) 50 mcg/0.5 mL susr injection; Give 1 dose now, second dose 2-6 months following dose 1.   Dispense: 0.5 mL; Refill: 1  - diph,Pertuss,Acell,,Tet Vac-PF (ADACEL) 2 Lf-(2.5-5-3-5 mcg)-5Lf/0.5 mL susp; 0.5 mL by IntraMUSCular route once for 1 dose. Dispense: 0.5 mL; Refill: 0    7. Dyslipidemia - will start on zetia. Unable to tolerate statins. - ezetimibe (ZETIA) 10 mg tablet; Take 1 Tab by mouth daily. Dispense: 90 Tab; Refill: 1    Follow-up Disposition:  Return in about 1 week (around 3/11/2019) for follow up visit. I have discussed the diagnosis with the patient and the intended plan as seen in the above orders. Patient verbalized understanding of the plan and agrees with the plan. The patient has received an after-visit summary and questions were answered concerning future plans. I have discussed medication side effects and warnings with the patient as well. Informed patient to return to the office if new symptoms arise.     Patient was seen and discussed with Dr. Constantino Gaffney MD  Family Medicine Resident

## 2019-03-04 NOTE — PROGRESS NOTES
Chief Complaint   Patient presents with    Diabetes     1. Have you been to the ER, urgent care clinic since your last visit? Hospitalized since your last visit? No    2. Have you seen or consulted any other health care providers outside of the [address] [place] [address] since your last visit? Include any pap smears or colon screening. No     Reviewed record in preparation for visit and have obtained necessary documentation.

## 2019-03-05 LAB
CHOLEST SERPL-MCNC: 233 MG/DL (ref 100–199)
HCV AB S/CO SERPL IA: <0.1 S/CO RATIO (ref 0–0.9)
HDLC SERPL-MCNC: 32 MG/DL
INTERPRETATION, 910389: NORMAL
LDLC SERPL CALC-MCNC: ABNORMAL MG/DL (ref 0–99)
PDF IMAGE, 910387: NORMAL
TRIGL SERPL-MCNC: 451 MG/DL (ref 0–149)
VLDLC SERPL CALC-MCNC: ABNORMAL MG/DL (ref 5–40)

## 2019-03-08 ENCOUNTER — HOSPITAL ENCOUNTER (OUTPATIENT)
Dept: NON INVASIVE DIAGNOSTICS | Age: 54
Discharge: HOME | End: 2019-03-08
Attending: FAMILY MEDICINE
Payer: MEDICARE

## 2019-03-08 VITALS
DIASTOLIC BLOOD PRESSURE: 72 MMHG | BODY MASS INDEX: 38.57 KG/M2 | WEIGHT: 240 LBS | SYSTOLIC BLOOD PRESSURE: 132 MMHG | HEIGHT: 66 IN

## 2019-03-08 DIAGNOSIS — I42.0 CARDIOMYOPATHY, DILATED, NONISCHEMIC (HCC): ICD-10-CM

## 2019-03-08 LAB
ECHO AO ROOT DIAM: 3.61 CM
ECHO AV AREA PEAK VELOCITY: 2.5 CM2
ECHO AV PEAK GRADIENT: 4.5 MMHG
ECHO AV PEAK VELOCITY: 105.71 CM/S
ECHO LA MAJOR AXIS: 3.48 CM
ECHO LA TO AORTIC ROOT RATIO: 0.96
ECHO LA VOL 2C: 32.65 ML (ref 18–58)
ECHO LA VOL 4C: 29.79 ML (ref 18–58)
ECHO LA VOL BP: 36.46 ML (ref 18–58)
ECHO LA VOL/BSA BIPLANE: 16.87 ML/M2 (ref 16–28)
ECHO LA VOLUME INDEX A2C: 15.11 ML/M2 (ref 16–28)
ECHO LA VOLUME INDEX A4C: 13.78 ML/M2 (ref 16–28)
ECHO LV INTERNAL DIMENSION DIASTOLIC: 5.17 CM (ref 4.2–5.9)
ECHO LV INTERNAL DIMENSION SYSTOLIC: 3.68 CM
ECHO LV IVSD: 0.9 CM (ref 0.6–1)
ECHO LV MASS 2D: 198.7 G (ref 88–224)
ECHO LV MASS INDEX 2D: 91.9 G/M2 (ref 49–115)
ECHO LV POSTERIOR WALL DIASTOLIC: 0.92 CM (ref 0.6–1)
ECHO LVOT DIAM: 1.91 CM
ECHO LVOT PEAK GRADIENT: 3.4 MMHG
ECHO LVOT PEAK VELOCITY: 92.73 CM/S
ECHO MV A VELOCITY: 72.3 CM/S
ECHO MV E DECELERATION TIME (DT): 201.7 MS
ECHO MV E VELOCITY: 73.82 CM/S
ECHO MV E/A RATIO: 1.02
ECHO MV REGURGITANT PEAK GRADIENT: 24.2 MMHG
ECHO MV REGURGITANT PEAK VELOCITY: 245.78 CM/S
ECHO PV MAX VELOCITY: 65.21 CM/S
ECHO PV PEAK GRADIENT: 1.7 MMHG
ECHO RV INTERNAL DIMENSION: 2.82 CM
ECHO TV REGURGITANT MAX VELOCITY: 237.88 CM/S
ECHO TV REGURGITANT PEAK GRADIENT: 22.6 MMHG

## 2019-03-08 PROCEDURE — 93306 TTE W/DOPPLER COMPLETE: CPT

## 2019-03-13 ENCOUNTER — OFFICE VISIT (OUTPATIENT)
Dept: FAMILY MEDICINE CLINIC | Age: 54
End: 2019-03-13

## 2019-03-13 VITALS
BODY MASS INDEX: 38.57 KG/M2 | HEIGHT: 66 IN | WEIGHT: 240 LBS | TEMPERATURE: 97 F | HEART RATE: 72 BPM | SYSTOLIC BLOOD PRESSURE: 132 MMHG | RESPIRATION RATE: 17 BRPM | OXYGEN SATURATION: 99 % | DIASTOLIC BLOOD PRESSURE: 87 MMHG

## 2019-03-13 DIAGNOSIS — I10 ESSENTIAL HYPERTENSION: ICD-10-CM

## 2019-03-13 DIAGNOSIS — I42.0 CARDIOMYOPATHY, DILATED, NONISCHEMIC (HCC): ICD-10-CM

## 2019-03-13 DIAGNOSIS — E78.2 MIXED HYPERLIPIDEMIA: ICD-10-CM

## 2019-03-13 DIAGNOSIS — E66.9 OBESITY (BMI 30-39.9): ICD-10-CM

## 2019-03-13 DIAGNOSIS — E11.65 UNCONTROLLED TYPE 2 DIABETES MELLITUS WITH HYPERGLYCEMIA (HCC): Primary | ICD-10-CM

## 2019-03-13 PROBLEM — E78.1 HYPERTRIGLYCERIDEMIA: Status: ACTIVE | Noted: 2019-03-13

## 2019-03-13 NOTE — PROGRESS NOTES
Chief Complaint   Patient presents with    Diabetes     Blood pressure 132/87, pulse 72, temperature 97 °F (36.1 °C), temperature source Oral, resp. rate 17, height 5' 6\" (1.676 m), weight 240 lb (108.9 kg), SpO2 99 %. 1. Have you been to the ER, urgent care clinic since your last visit? Hospitalized since your last visit? No    2. Have you seen or consulted any other health care providers outside of the [address] [place] [address] since your last visit? Include any pap smears or colon screening.  No

## 2019-03-13 NOTE — PROGRESS NOTES
I reviewed with the resident the medical history and the resident's findings on the physical examination. I discussed with the resident the patient's diagnosis and concur with the plan. F/u on lab results  DM, A1C 10.9.   Recently started on Jardiance, continue metformin and glucotrol   Will f/u with sports re: knee injection

## 2019-03-13 NOTE — PROGRESS NOTES
[address] [facility],[address],    [address]   Affiliated with [facility] and [address] Note   Subjective:   Beka Nicholas is a 48 y.o. male presents for evaluation of lab discussion and  medication evaluation  CC:\" I want to know about my labs\"   History provided by patient     HPI:  Pt was seen here on 3/4 found to have uncontrolled DM after A1c was 10.9 on 2/27. He was started on Jardiance, continued on glucotrol 5mg and metformin 500mg bid. He reports he is taking the medication with intermittent bouts of diarrhea depending on diet. Pt reports he does not eat a diabetic diet. Pt has an appointment with Kashif Hernandez on 4/11/2019. Echo was completed on 3/8/2019 which was normal.  Pt was referred to phyiscal therapy for OA in left knee. He has decided not go to physical therapy due to cost. He recently started going to the gym. He also went to the eye doctor last week on 3/6/2019 he was told he does not have catracts or any abnormalities. Current Outpatient Medications on File Prior to Visit   Medication Sig Dispense Refill    glipiZIDE (GLUCOTROL) 5 mg tablet Take 1 Tab by mouth two (2) times a day. 180 Tab 1    empagliflozin (JARDIANCE) 10 mg tablet Take 1 Tab by mouth daily. 90 Tab 1    metFORMIN (GLUCOPHAGE) 500 mg tablet Take 1 Tab by mouth two (2) times daily (with meals). 180 Tab 1    lisinopril (PRINIVIL, ZESTRIL) 40 mg tablet Take 1 Tab by mouth daily. 90 Tab 1    metoprolol tartrate (LOPRESSOR) 100 mg IR tablet Take 1 Tab by mouth two (2) times a day. 180 Tab 1    ezetimibe (ZETIA) 10 mg tablet Take 1 Tab by mouth daily. 90 Tab 1    allopurinol (ZYLOPRIM) 300 mg tablet Take 300 mg by mouth daily.  indomethacin (INDOCIN) 25 mg capsule 1-2 tid prn pain, take with food.  90 Cap 2    varicella-zoster recombinant, PF, (SHINGRIX, PF,) 50 mcg/0.5 mL susr injection Give 1 dose now, second dose 2-6 months following dose 1. 0.5 mL 1     No current facility-administered medications on file prior to visit. Past Medical History:   Diagnosis Date    Arthritis     gout    Gout     Heart disease     Hypercholesterolemia     Hypertension     Obstructive sleep apnea (adult) (pediatric) 2012    Other ill-defined conditions(799.89)     LE varicosities, hyperlipidemia    Sciatica     Unspecified essential hypertension 2012       Social History     Socioeconomic History    Marital status:      Spouse name: Not on file    Number of children: Not on file    Years of education: Not on file    Highest education level: Not on file   Social Needs    Financial resource strain: Not on file    Food insecurity - worry: Not on file    Food insecurity - inability: Not on file   [employer] needs - medical: Not on file   [employer] needs - non-medical: Not on file   Occupational History    Not on file   Tobacco Use    Smoking status: Former Smoker     Types: Cigarettes     Last attempt to quit: 1990     Years since quittin.2    Smokeless tobacco: Never Used   Substance and Sexual Activity    Alcohol use: Yes     Alcohol/week: 0.0 oz     Comment: social    Drug use: No    Sexual activity: Yes     Partners: Female   Other Topics Concern    Not on file   Social History Narrative    ** Merged History Encounter **            Review of Systems   Constitutional: Negative for chills and fever. Respiratory: Negative for cough, hemoptysis and shortness of breath. Cardiovascular: Negative for chest pain. Gastrointestinal: Negative for abdominal pain, diarrhea, heartburn, nausea and vomiting. Genitourinary: Negative for dysuria. Musculoskeletal: Negative for myalgias. Skin: Negative for itching and rash. Neurological: Negative for dizziness, tingling, sensory change, focal weakness and headaches.          Objective:     Visit Vitals  /87   Pulse 72   Temp 97 °F (36.1 °C) (Oral)   Resp 17   Ht 5' 6\" (1.676 m)   Wt 240 lb (108.9 kg)   SpO2 99%   BMI 38.74 kg/m²      Physical Exam:  Physical Exam   Constitutional: He is oriented to person, place, and time. He appears well-developed and well-nourished. No distress. HENT:   Head: Normocephalic and atraumatic. Eyes: Conjunctivae and EOM are normal. Pupils are equal, round, and reactive to light. No scleral icterus. Neck: Normal range of motion. Neck supple. Cardiovascular: Normal rate, regular rhythm, normal heart sounds and intact distal pulses. Exam reveals no gallop and no friction rub. No murmur heard. Pulmonary/Chest: Effort normal and breath sounds normal. No respiratory distress. He has no wheezes. He has no rales. He exhibits no tenderness. Abdominal: Soft. Bowel sounds are normal. There is no tenderness. Musculoskeletal: He exhibits no edema. Neurological: He is alert and oriented to person, place, and time. No cranial nerve deficit. Coordination normal.   Skin: Skin is warm and dry. No rash noted. He is not diaphoretic. No erythema. Psychiatric: He has a normal mood and affect. Nursing note and vitals reviewed. Assessment and orders:       ICD-10-CM ICD-9-CM    1. Uncontrolled type 2 diabetes mellitus with hyperglycemia (HCC) E11.65 250.02    2. Essential hypertension I10 401.9    3. Cardiomyopathy, dilated, nonischemic (HCC) I42.0 425.4    4. Obesity (BMI 30-39. 9) E66.9 278.00      Diagnoses and all orders for this visit:    1. Uncontrolled type 2 diabetes mellitus with hyperglycemia (HCC)  - Discussed  Diet   - Continue Jardiance, Metformin and glucotrol     2. Essential hypertension  - BP is controlled   - Continue lisinopril    3. Cardiomyopathy, dilated, nonischemic (HCC)  - Continue Metoprolol  - Pt has an apponitnment with  on 4/11 to discuss Echo findings     4. Obesity (BMI 30-39. 9)  I have reviewed/discussed the above normal BMI with the patient.   I have recommended the following interventions: dietary management education, guidance, and counseling, encourage exercise, monitor weight and prescribed dietary intake . 5. Hyperlipidemia   - Continue Zetia as pt has a statin intolerance        Follow-up Disposition:  Return in about 4 weeks (around 4/10/2019) for dr Faiza Espinal for knee evaluation after working out for 1 month. I have reviewed patient medical and social history and medications. I have reviewed pertinent labs results and other data. I have discussed the diagnosis with the patient and the intended plan as seen in the above orders. The patient has received an after-visit summary and questions were answered concerning future plans. I have discussed medication side effects and warnings with the patient as well.     Valeria Hall MD  Resident [facility]  03/13/19

## 2019-03-14 ENCOUNTER — APPOINTMENT (OUTPATIENT)
Dept: PHYSICAL THERAPY | Age: 54
End: 2019-03-14

## 2019-04-01 DIAGNOSIS — E11.65 UNCONTROLLED TYPE 2 DIABETES MELLITUS WITH HYPERGLYCEMIA (HCC): ICD-10-CM

## 2019-04-01 RX ORDER — GLIPIZIDE 5 MG/1
5 TABLET ORAL 2 TIMES DAILY
Qty: 180 TAB | Refills: 1 | Status: SHIPPED | OUTPATIENT
Start: 2019-04-01 | End: 2019-05-22 | Stop reason: SDUPTHER

## 2019-04-11 ENCOUNTER — OFFICE VISIT (OUTPATIENT)
Dept: CARDIOLOGY CLINIC | Age: 54
End: 2019-04-11

## 2019-04-11 VITALS
SYSTOLIC BLOOD PRESSURE: 140 MMHG | OXYGEN SATURATION: 98 % | WEIGHT: 239.4 LBS | HEART RATE: 94 BPM | BODY MASS INDEX: 38.47 KG/M2 | DIASTOLIC BLOOD PRESSURE: 100 MMHG | HEIGHT: 66 IN

## 2019-04-11 DIAGNOSIS — I10 ESSENTIAL HYPERTENSION: ICD-10-CM

## 2019-04-11 DIAGNOSIS — I42.0 CARDIOMYOPATHY, DILATED, NONISCHEMIC (HCC): Primary | ICD-10-CM

## 2019-04-11 DIAGNOSIS — E11.65 UNCONTROLLED TYPE 2 DIABETES MELLITUS WITH HYPERGLYCEMIA (HCC): ICD-10-CM

## 2019-04-11 NOTE — PROGRESS NOTES
Patient has shortness of breath when walking fast or steps      Visit Vitals  BP (!) 140/100 (BP 1 Location: Right arm, BP Patient Position: Sitting)   Pulse 94   Ht 5' 6\" (1.676 m)   Wt 239 lb 6.4 oz (108.6 kg)   SpO2 98%   BMI 38.64 kg/m²

## 2019-04-11 NOTE — PROGRESS NOTES
Sally Davila MD. [facility] [place]              Patient: Lin Oppenheim  : 1965      Today's Date: 2019            HISTORY OF PRESENT ILLNESS:     History of Present Illness:  Referred for non-ischemic cardiomyopathy. LVEF was 30% in . Last saw Dr. Asmita Christianson in . He says he has been feeling Ok. No CP or SOB. WAGNER doing steps. No orthopnea. Has not taken BP meds today since still in luggage. PAST MEDICAL HISTORY:     Past Medical History:   Diagnosis Date    Arthritis     gout    Cardiomyopathy ([facility] [address]     LVEF was reported 30% in ; normal LVEF 3/19     Gout     Hypercholesterolemia     Hypertension     Obesity     Obstructive sleep apnea (adult) (pediatric) 2012    could not tolerate CPAP     Other ill-defined conditions(799.89)     LE varicosities, hyperlipidemia    Sciatica     Unspecified essential hypertension 2012         Past Surgical History:   Procedure Laterality Date    HX APPENDECTOMY      HX ORTHOPAEDIC      L gt toe           MEDICATIONS:     Current Outpatient Medications   Medication Sig Dispense Refill    glipiZIDE (GLUCOTROL) 5 mg tablet Take 1 Tab by mouth two (2) times a day. 180 Tab 1    empagliflozin (JARDIANCE) 10 mg tablet Take 1 Tab by mouth daily. 90 Tab 1    metFORMIN (GLUCOPHAGE) 500 mg tablet Take 1 Tab by mouth two (2) times daily (with meals). 180 Tab 1    lisinopril (PRINIVIL, ZESTRIL) 40 mg tablet Take 1 Tab by mouth daily. 90 Tab 1    metoprolol tartrate (LOPRESSOR) 100 mg IR tablet Take 1 Tab by mouth two (2) times a day. 180 Tab 1    ezetimibe (ZETIA) 10 mg tablet Take 1 Tab by mouth daily. 90 Tab 1    allopurinol (ZYLOPRIM) 300 mg tablet Take 300 mg by mouth daily.  indomethacin (INDOCIN) 25 mg capsule 1-2 tid prn pain, take with food.  90 Cap 2       Allergies   Allergen Reactions    Diovan [Valsartan] Other (comments)     Boils & knots al over head & upper body    Diovan [Valsartan] Hives    Elavil Other (comments)     Hallucinations. Seeing shadows and hearing voices.  Elavil Other (comments)     confusion    Statins-Hmg-Coa Reductase Inhibitors Other (comments)             SOCIAL HISTORY:     Social History     Tobacco Use    Smoking status: Former Smoker     Types: Cigarettes     Last attempt to quit: 1990     Years since quittin.3    Smokeless tobacco: Never Used   Substance Use Topics    Alcohol use: Yes     Alcohol/week: 0.0 oz     Comment: social    Drug use: No         FAMILY HISTORY:     Family History   Problem Relation Age of Onset    Diabetes Mother     Hypertension Mother            REVIEW OF SYMPTOMS:     Review of Symptoms:  Constitutional: Negative for fever, chills  HEENT: Negative for nosebleeds, tinnitus, and vision changes. Respiratory: Negative for cough, wheezing  Cardiovascular: Negative for orthopnea, claudication, syncope, and PND. Gastrointestinal: Negative for  melena. Genitourinary: Negative for dysuria  Musculoskeletal: Negative for myalgias. Skin: Negative for rash  Heme: No problems bleeding. Neurological: Negative for speech change and focal weakness. PHYSICAL EXAM:     Physical Exam:  Visit Vitals  BP (!) 140/100 (BP 1 Location: Right arm, BP Patient Position: Sitting)   Pulse 94   Ht 5' 6\" (1.676 m)   Wt 239 lb 6.4 oz (108.6 kg)   SpO2 98%   BMI 38.64 kg/m²     Patient appears generally well, mood and affect are appropriate and pleasant. HEENT:  Hearing intact, non-icteric, normocephalic, atraumatic. Neck Exam: Supple, No JVD or carotid bruits. Lung Exam: Clear to auscultation, even breath sounds. Cardiac Exam: Regular rate and rhythm with no murmur  Abdomen: Soft, non-tender, normal bowel sounds. N+ obese   Extremities: Moves all ext well. No lower extremity edema. Vascular: 2+ dorsalis pedis pulses bilaterally.   Psych: Appropriate affect  Neuro - Grossly intact        LABS / OTHER STUDIES:     Lab Results   Component Value Date/Time    Sodium 140 02/27/2019 12:26 PM    Potassium 4.9 02/27/2019 12:26 PM    Chloride 102 02/27/2019 12:26 PM    CO2 21 02/27/2019 12:26 PM    Anion gap 11 02/01/2019 12:21 PM    Glucose 205 (H) 02/27/2019 12:26 PM    BUN 5 (L) 02/27/2019 12:26 PM    Creatinine 0.85 02/27/2019 12:26 PM    BUN/Creatinine ratio 6 (L) 02/27/2019 12:26 PM    GFR est  02/27/2019 12:26 PM    GFR est non-AA 99 02/27/2019 12:26 PM    Calcium 9.2 02/27/2019 12:26 PM    Bilirubin, total 0.7 02/27/2019 12:26 PM    AST (SGOT) 28 02/27/2019 12:26 PM    Alk. phosphatase 99 02/27/2019 12:26 PM    Protein, total 6.8 02/27/2019 12:26 PM    Albumin 4.0 02/27/2019 12:26 PM    Globulin 4.3 (H) 02/01/2019 12:21 PM    A-G Ratio 1.4 02/27/2019 12:26 PM    ALT (SGPT) 26 02/27/2019 12:26 PM     Lab Results   Component Value Date/Time    WBC 10.8 02/27/2019 12:26 PM    Hemoglobin (POC) 13.6 05/15/2015 04:54 PM    HGB 13.9 02/27/2019 12:26 PM    Hematocrit (POC) 40 05/15/2015 04:54 PM    HCT 43.2 02/27/2019 12:26 PM    PLATELET 037 20/05/0839 12:26 PM    MCV 95 02/27/2019 12:26 PM     Lab Results   Component Value Date/Time    Cholesterol, total 233 (H) 03/04/2019 10:31 AM    HDL Cholesterol 32 (L) 03/04/2019 10:31 AM    LDL,Direct 148 (H) 11/11/2014 12:39 PM    LDL, calculated Comment 03/04/2019 10:31 AM    VLDL, calculated Comment 03/04/2019 10:31 AM    Triglyceride 451 (H) 03/04/2019 10:31 AM    CHOL/HDL Ratio 6.4 (H) 09/11/2015 10:05 AM           CARDIAC DIAGNOSTICS:     Cardiac Evaluation Includes:    Stress Echo 7/18/13 - workload ?;  Normal stress EKG;  LVEF 30% at rest and 60% after exercise      Echo 3/8/19 - TDS; LVEF 55%  - I viewed images myself.      EKG 4/11/19 - NSR, PRWP       ASSESSMENT AND PLAN:     Assessment and Plan:  1) History of cardiomyopathy   - LVEF noted to be 30% on a stress echo with good augmentation   - Cardiomyopathy may be to due to HTN?  - LVEF is normal on echo 3/19   - Will cont cardiomyopathy meds (BB, ACE-I) - aim for good BP control     2) HTN  - BP high today since has not had any meds - says BP usually better  - continue regimen and follow BP   - I asked him to follow BP at home - goal < 130/80     3) Dyslipidemia   - cannot tolerate statin due to blisters he says   - On zetia   - management per PCP    4) Obesity  - discussed weight loss     5) See me back in one year. Patient expressed understanding of the plan - questions were answered. [facility]. Johnny Andrew MD, [address].  [address], [address]  Ph: [phone]   Ph [phone]

## 2019-05-13 ENCOUNTER — TELEPHONE (OUTPATIENT)
Dept: FAMILY MEDICINE CLINIC | Age: 54
End: 2019-05-13

## 2019-05-13 ENCOUNTER — OFFICE VISIT (OUTPATIENT)
Dept: FAMILY MEDICINE CLINIC | Age: 54
End: 2019-05-13

## 2019-05-13 VITALS
OXYGEN SATURATION: 95 % | SYSTOLIC BLOOD PRESSURE: 133 MMHG | HEIGHT: 66 IN | HEART RATE: 73 BPM | BODY MASS INDEX: 39.53 KG/M2 | DIASTOLIC BLOOD PRESSURE: 87 MMHG | WEIGHT: 246 LBS | TEMPERATURE: 97.7 F

## 2019-05-13 DIAGNOSIS — R10.13 EPIGASTRIC PAIN: Primary | ICD-10-CM

## 2019-05-13 RX ORDER — PANTOPRAZOLE SODIUM 40 MG/1
40 TABLET, DELAYED RELEASE ORAL DAILY
Qty: 30 TAB | Refills: 0 | Status: SHIPPED | OUTPATIENT
Start: 2019-05-13

## 2019-05-13 RX ORDER — PANTOPRAZOLE SODIUM 40 MG/1
40 TABLET, DELAYED RELEASE ORAL DAILY
Qty: 30 TAB | Refills: 0 | Status: SHIPPED | OUTPATIENT
Start: 2019-05-13 | End: 2019-05-13 | Stop reason: SDUPTHER

## 2019-05-13 NOTE — TELEPHONE ENCOUNTER
Vianey Strange with [facility] calling and asking that he be contacted today and ASAP to discuss cat scan ordered.     Pt has appt today at 1:00 pm    Call [phone]

## 2019-05-13 NOTE — PROGRESS NOTES
[address] [facility] Jose Manuel is an 48 y.o. male who presents for abdominal pain. Pt shares that 5 days ago he developed epigastric pain. The pain described as achy. Worse after eating or drinking. Pt states even palpating epigastric area is uncomfortable. Pain does not radiatae. No hx of GERD but shares he does have a metallic taste in mouth at times and tends to burp after eating. No EtOH use. Still has gallbladder. No fever, CP, SOB, N/V/D, fatigue, back pain, dysuria, or hematuria. Only abdominal surgery in past was appendectomy. No sick contacts. No recent travel. Shares that at one time he was told he had bacteria in his stomach and given abx more that 10 years ago. Can sleep. Denies stress. Does have hx of uncontrolled DM, last A1c was 10.9 2/2019, states he is taking medications. Daily BM, denies constipation. Allergies - reviewed: Allergies   Allergen Reactions    Diovan [Valsartan] Other (comments)     Boils & knots al over head & upper body    Diovan [Valsartan] Hives    Elavil Other (comments)     Hallucinations. Seeing shadows and hearing voices.  Elavil Other (comments)     confusion    Statins-Hmg-Coa Reductase Inhibitors Other (comments)         Medications - reviewed:   Current Outpatient Medications   Medication Sig    pantoprazole (PROTONIX) 40 mg tablet Take 1 Tab by mouth daily.  glipiZIDE (GLUCOTROL) 5 mg tablet Take 1 Tab by mouth two (2) times a day.  empagliflozin (JARDIANCE) 10 mg tablet Take 1 Tab by mouth daily.  metFORMIN (GLUCOPHAGE) 500 mg tablet Take 1 Tab by mouth two (2) times daily (with meals).  lisinopril (PRINIVIL, ZESTRIL) 40 mg tablet Take 1 Tab by mouth daily.  metoprolol tartrate (LOPRESSOR) 100 mg IR tablet Take 1 Tab by mouth two (2) times a day.  ezetimibe (ZETIA) 10 mg tablet Take 1 Tab by mouth daily.  allopurinol (ZYLOPRIM) 300 mg tablet Take 300 mg by mouth daily.     indomethacin (INDOCIN) 25 mg capsule 1-2 tid prn pain, take with food. No current facility-administered medications for this visit. Past Medical History - reviewed:  Past Medical History:   Diagnosis Date    Arthritis     gout    Cardiomyopathy ([facility] [address]     LVEF was reported 30% in ; normal LVEF 3/19     Gout     Hypercholesterolemia     Hypertension     Obesity     Obstructive sleep apnea (adult) (pediatric) 2012    could not tolerate CPAP     Other ill-defined conditions(799.89)     LE varicosities, hyperlipidemia    Sciatica     Unspecified essential hypertension 2012         Past Surgical History - reviewed:   Past Surgical History:   Procedure Laterality Date    HX APPENDECTOMY      HX ORTHOPAEDIC      L gt toe         Social History - reviewed:  Social History     Socioeconomic History    Marital status:    Tobacco Use    Smoking status: Former Smoker     Types: Cigarettes     Last attempt to quit: 1990     Years since quittin.4    Smokeless tobacco: Never Used   Substance and Sexual Activity    Alcohol use: Yes     Alcohol/week: 0.0 oz     Comment: social    Drug use: No    Sexual activity: Yes     Partners: Female         Family History - reviewed:  Family History   Problem Relation Age of Onset    Diabetes Mother     Hypertension Mother          Immunizations - reviewed:   Immunization History   Administered Date(s) Administered    Influenza Vaccine 2008    Influenza Vaccine (Quad) PF 10/15/2013, 2014, 2015    Pneumococcal Polysaccharide (PPSV-23) 2013       ROS  Negative for: fever, chest pain, shortness of breath, leg swelling, exertional dyspnea, palpitations. Physical Exam  Visit Vitals  /87   Pulse 73   Temp 97.7 °F (36.5 °C)   Ht 5' 6\" (1.676 m)   Wt 246 lb (111.6 kg)   SpO2 95%   BMI 39.71 kg/m²       Constitutional: He is oriented to person, place, and time. No distress. Conversant. Cooperative. Concerned regarding abdominal pain.    HENT:   Head: Normocephalic and atraumatic. Eyes: Conjunctivae and EOM are normal. Pupils are equal, round, and reactive to light. No scleral icterus. Neck: Normal range of motion. Neck supple. Cardiovascular: Normal rate, regular rhythm, normal heart sounds and intact distal pulses. Exam reveals no gallop and no friction rub. No murmur heard. Pulmonary/Chest: Effort normal and breath sounds normal. No respiratory distress. He has no wheezes. He has no rales. He exhibits no tenderness. Abdominal: Soft. Bowel sounds are normal. There is tenderness over epigastric region. No guarding or rebound. Obese. Surgical scar noted in RLQ from appendectomy he had in past.  Musculoskeletal: He exhibits no edema. Neurological: He is alert and oriented to person, place, and time. No cranial nerve deficit. Coordination normal.   Skin: Skin is warm and dry. No rash noted. He is not diaphoretic. No erythema. Psychiatric: He has a normal mood and affect. Assessment/Plan    ICD-10-CM ICD-9-CM    1. Epigastric pain R10.13 789.06 CBC WITH AUTOMATED DIFF      METABOLIC PANEL, COMPREHENSIVE      LIPASE      H. PYLORI ABS, IGG, IGA, IGM      REFERRAL TO GASTROENTEROLOGY      pantoprazole (PROTONIX) 40 mg tablet      CT ABD PELV W WO CONT   2. BMI 39.0-39.9,adult Z68.39 V85.39      -Based on exam, pt without signs of acute abdomen. Discussed risk and benefits of ED visit, pt opted to manage as outpatient. He was seen by attending as well who agreed with  The following plan:  -obtain labs today, noted above  -obtain CT abd/pel to further evaluate and rule out possible causes including pancreatitis. I was able to schedule CT for today but pt had to  his kids and after spending time reviewing his schedule CT appt made for 5/15.  -will treat pt for GERD with PPI  -will refer to GI incase pt work up negative for EGD possible. Pt also need colonoscopy for colon cancer screen.  -Close follow-up with patient, pt agreed.  Detailed ED precautions given to pt who verbally repeated the signs and symptoms he should be monitoring for. I have reviewed/discussed the above normal BMI with the patient. I have recommended the following interventions: dietary management education, guidance, and counseling, encourage exercise and monitor weight . Bharath Patelk Follow-up and Dispositions    · Return in about 1 week (around 5/20/2019) for follow-up abdominal pain. I have discussed the diagnosis with the patient and the intended plan as seen in the above orders. Patient verbalized understanding of the plan and agrees with the plan. The patient has received an after-visit summary and questions were answered concerning future plans. I have discussed medication side effects and warnings with the patient as well. Informed patient to return to the office if new symptoms arise.         Madiha Cohn DO  Family Medicine Resident

## 2019-05-13 NOTE — PROGRESS NOTES
Chief Complaint   Patient presents with    Abdominal Pain     X 5 days; upper abdomin; worst after eating

## 2019-05-15 ENCOUNTER — HOSPITAL ENCOUNTER (OUTPATIENT)
Dept: CT IMAGING | Age: 54
Discharge: HOME | End: 2019-05-15
Attending: FAMILY MEDICINE
Payer: MEDICARE

## 2019-05-15 DIAGNOSIS — R10.13 EPIGASTRIC PAIN: ICD-10-CM

## 2019-05-15 PROCEDURE — 74177 CT ABD & PELVIS W/CONTRAST: CPT

## 2019-05-15 PROCEDURE — 82565 ASSAY OF CREATININE: CPT

## 2019-05-15 PROCEDURE — 74178 CT ABD&PLV WO CNTR FLWD CNTR: CPT

## 2019-05-15 PROCEDURE — 74011636320 HC RX REV CODE- 636/320: Performed by: STUDENT IN AN ORGANIZED HEALTH CARE EDUCATION/TRAINING PROGRAM

## 2019-05-15 RX ADMIN — IOPAMIDOL 100 ML: 755 INJECTION, SOLUTION INTRAVENOUS at 17:03

## 2019-05-16 LAB
ALBUMIN SERPL-MCNC: 4.1 G/DL (ref 3.5–5.5)
ALBUMIN/GLOB SERPL: 1.5 {RATIO} (ref 1.2–2.2)
ALP SERPL-CCNC: 98 IU/L (ref 39–117)
ALT SERPL-CCNC: 20 IU/L (ref 0–44)
AST SERPL-CCNC: 21 IU/L (ref 0–40)
BASOPHILS # BLD AUTO: 0 X10E3/UL (ref 0–0.2)
BASOPHILS NFR BLD AUTO: 0 %
BILIRUB SERPL-MCNC: 0.4 MG/DL (ref 0–1.2)
BUN SERPL-MCNC: 15 MG/DL (ref 6–24)
BUN/CREAT SERPL: 16 (ref 9–20)
CALCIUM SERPL-MCNC: 9.6 MG/DL (ref 8.7–10.2)
CHLORIDE SERPL-SCNC: 100 MMOL/L (ref 96–106)
CO2 SERPL-SCNC: 23 MMOL/L (ref 20–29)
CREAT SERPL-MCNC: 0.94 MG/DL (ref 0.76–1.27)
EOSINOPHIL # BLD AUTO: 0.3 X10E3/UL (ref 0–0.4)
EOSINOPHIL NFR BLD AUTO: 3 %
ERYTHROCYTE [DISTWIDTH] IN BLOOD BY AUTOMATED COUNT: 12.8 % (ref 12.3–15.4)
GLOBULIN SER CALC-MCNC: 2.8 G/DL (ref 1.5–4.5)
GLUCOSE SERPL-MCNC: 154 MG/DL (ref 65–99)
H PYLORI IGA SER-ACNC: 13.2 UNITS (ref 0–8.9)
H PYLORI IGG SER IA-ACNC: <0.8 INDEX VALUE (ref 0–0.79)
H PYLORI IGM SER-ACNC: <9 UNITS (ref 0–8.9)
HCT VFR BLD AUTO: 45.6 % (ref 37.5–51)
HGB BLD-MCNC: 15.2 G/DL (ref 13–17.7)
IMM GRANULOCYTES # BLD AUTO: 0 X10E3/UL (ref 0–0.1)
IMM GRANULOCYTES NFR BLD AUTO: 0 %
LIPASE SERPL-CCNC: 36 U/L (ref 13–78)
LYMPHOCYTES # BLD AUTO: 3.7 X10E3/UL (ref 0.7–3.1)
LYMPHOCYTES NFR BLD AUTO: 35 %
MCH RBC QN AUTO: 31.2 PG (ref 26.6–33)
MCHC RBC AUTO-ENTMCNC: 33.3 G/DL (ref 31.5–35.7)
MCV RBC AUTO: 94 FL (ref 79–97)
MONOCYTES # BLD AUTO: 0.7 X10E3/UL (ref 0.1–0.9)
MONOCYTES NFR BLD AUTO: 7 %
NEUTROPHILS # BLD AUTO: 5.7 X10E3/UL (ref 1.4–7)
NEUTROPHILS NFR BLD AUTO: 55 %
PLATELET # BLD AUTO: 297 X10E3/UL (ref 150–379)
POTASSIUM SERPL-SCNC: 4.5 MMOL/L (ref 3.5–5.2)
PROT SERPL-MCNC: 6.9 G/DL (ref 6–8.5)
RBC # BLD AUTO: 4.87 X10E6/UL (ref 4.14–5.8)
SODIUM SERPL-SCNC: 138 MMOL/L (ref 134–144)
WBC # BLD AUTO: 10.4 X10E3/UL (ref 3.4–10.8)

## 2019-05-17 LAB — CREAT BLD-MCNC: 0.9 MG/DL (ref 0.6–1.3)

## 2019-05-20 ENCOUNTER — TELEPHONE (OUTPATIENT)
Dept: FAMILY MEDICINE CLINIC | Age: 54
End: 2019-05-20

## 2019-05-20 NOTE — TELEPHONE ENCOUNTER
Pt was called 19.  confirmed. Discussed labs and imaging results. He states that PPI has helped his abdominal pain. But still having pain. Offered him to make appt but pt needed to check schedule. Detailed precautions given. He agreed to make appt with me this week to be seen. All questions answered.     Leslie Hawk DO

## 2019-05-22 DIAGNOSIS — E11.65 UNCONTROLLED TYPE 2 DIABETES MELLITUS WITH HYPERGLYCEMIA (HCC): ICD-10-CM

## 2019-05-23 RX ORDER — GLIPIZIDE 5 MG/1
5 TABLET ORAL 2 TIMES DAILY
Qty: 180 TAB | Refills: 1 | Status: SHIPPED | OUTPATIENT
Start: 2019-05-23 | End: 2020-02-11 | Stop reason: SDUPTHER

## 2019-09-04 ENCOUNTER — OFFICE VISIT (OUTPATIENT)
Dept: FAMILY MEDICINE CLINIC | Age: 54
End: 2019-09-04

## 2019-09-04 VITALS
WEIGHT: 245 LBS | BODY MASS INDEX: 39.37 KG/M2 | OXYGEN SATURATION: 95 % | HEART RATE: 100 BPM | DIASTOLIC BLOOD PRESSURE: 88 MMHG | SYSTOLIC BLOOD PRESSURE: 124 MMHG | RESPIRATION RATE: 18 BRPM | TEMPERATURE: 98.6 F | HEIGHT: 66 IN

## 2019-09-04 DIAGNOSIS — M62.830 LUMBAR PARASPINAL MUSCLE SPASM: Primary | ICD-10-CM

## 2019-09-04 DIAGNOSIS — G57.02 PIRIFORMIS SYNDROME OF LEFT SIDE: ICD-10-CM

## 2019-09-04 RX ORDER — NAPROXEN 500 MG/1
500 TABLET ORAL 2 TIMES DAILY WITH MEALS
Qty: 30 TAB | Refills: 0 | Status: SHIPPED | OUTPATIENT
Start: 2019-09-04 | End: 2020-10-21

## 2019-09-04 NOTE — PATIENT INSTRUCTIONS
Piriformis Syndrome: Exercises  Introduction  Here are some examples of exercises for you to try. The exercises may be suggested for a condition or for rehabilitation. Start each exercise slowly. Ease off the exercises if you start to have pain. You will be told when to start these exercises and which ones will work best for you. How to do the exercises  Hip rotator stretch    1. Lie on your back with both knees bent and your feet flat on the floor. 2. Put the ankle of your affected leg on your opposite thigh near your knee. 3. Use your hand to gently push your knee (on your affected leg) away from your body until you feel a gentle stretch around your hip. 4. Hold the stretch for 15 to 30 seconds. 5. Repeat 2 to 4 times. 6. Switch legs and repeat steps 1 through 5. Piriformis stretch    1. Lie on your back with your legs straight. 2. Lift your affected leg and bend your knee. With your opposite hand, reach across your body, and then gently pull your knee toward your opposite shoulder. 3. Hold the stretch for 15 to 30 seconds. 4. Repeat with your other leg. 5. Repeat 2 to 4 times on each side. Lower abdominal strengthening    1. Lie on your back with your knees bent and your feet flat on the floor. 2. Tighten your belly muscles by pulling your belly button in toward your spine. 3. Lift one foot off the floor and bring your knee toward your chest, so that your knee is straight above your hip and your leg is bent like the letter \"L. \"  4. Lift the other knee up to the same position. 5. Lower one leg at a time to the starting position. 6. Keep alternating legs until you have lifted each leg 8 to 12 times. 7. Be sure to keep your belly muscles tight and your back still as you are moving your legs. Be sure to breathe normally. Follow-up care is a key part of your treatment and safety. Be sure to make and go to all appointments, and call your doctor if you are having problems.  It's also a good idea to know your test results and keep a list of the medicines you take. Current as of: September 20, 2018  Content Version: 12.1  © 2691-7095 [employer], [employer]. Care instructions adapted under license by [employer] (which disclaims liability or warranty for this information). If you have questions about a medical condition or this instruction, always ask your healthcare professional. [place] [address] any warranty or liability for your use of this information.

## 2019-09-04 NOTE — PROGRESS NOTES
Subjective:   History: This patient is a 48 y.o. male with a chief complaint of back pain. Patient reports lumbar and left buttock pain for 5 days. States that the pain is owrse when laying down. Improved with walking. He has been doing exercises at home with minimal relief. Rates the pain 5/10 with walking, 9/10 with sitting. Described as a sharp pain with radiation to the knee. He has been taking IBU with minimal relief. States that he was taking Percocet with relief. Denies numbness or tingling in his toes. Denies trauma or falls. No bowel or bladder incontinence. No fever, night sweats, or weight changes. No saddle anesthesia. Review of Systems:  General/Constitutional:  No fever, chills, sweats, fatigue, night sweats, weakness, weight loss or weight gain   Head: No headache, no trauma   Neck: No swelling, masses, stiffness, pain, or limited movement   Cardiac: No chest pain   Respiratory: No cough, shortness of breath, or dyspnea on exertion   GI: No incontinence. No nausea/vomiting, diarrhea, abdominal pain, bloody or dark stools  : No incontinence. No change in urinary habits. Peripheral Vascular: No edema, coldness, numbness, discoloration, pain, or paresthesias   Musculoskeletal: As per HPI  Neurological: No saddle distribution paresthesia. No siatic pain. No loss of consciousness, dizziness, seizures, dysarthria, cognitive changes, problems with balance, or unilateral weakness.     Past Medical History:   Diagnosis Date    Arthritis     gout    Cardiomyopathy ([facility] [address]     LVEF was reported 30% in 2013; normal LVEF 3/19     Gout     Hypercholesterolemia     Hypertension     Obesity     Obstructive sleep apnea (adult) (pediatric) 11/30/2012    could not tolerate CPAP     Other ill-defined conditions(799.89)     LE varicosities, hyperlipidemia    Sciatica     Unspecified essential hypertension 11/30/2012     Family History   Problem Relation Age of Onset    Diabetes Mother    [facility] Hypertension Mother      Current Outpatient Medications   Medication Sig Dispense Refill    naproxen (NAPROSYN) 500 mg tablet Take 1 Tab by mouth two (2) times daily (with meals). 30 Tab 0    glipiZIDE (GLUCOTROL) 5 mg tablet Take 1 Tab by mouth two (2) times a day. 180 Tab 1    pantoprazole (PROTONIX) 40 mg tablet Take 1 Tab by mouth daily. 30 Tab 0    empagliflozin (JARDIANCE) 10 mg tablet Take 1 Tab by mouth daily. 90 Tab 1    metFORMIN (GLUCOPHAGE) 500 mg tablet Take 1 Tab by mouth two (2) times daily (with meals). 180 Tab 1    lisinopril (PRINIVIL, ZESTRIL) 40 mg tablet Take 1 Tab by mouth daily. 90 Tab 1    metoprolol tartrate (LOPRESSOR) 100 mg IR tablet Take 1 Tab by mouth two (2) times a day. 180 Tab 1    ezetimibe (ZETIA) 10 mg tablet Take 1 Tab by mouth daily. 90 Tab 1    allopurinol (ZYLOPRIM) 300 mg tablet Take 300 mg by mouth daily.  indomethacin (INDOCIN) 25 mg capsule 1-2 tid prn pain, take with food. 90 Cap 2     Allergies   Allergen Reactions    Diovan [Valsartan] Other (comments)     Boils & knots al over head & upper body    Diovan [Valsartan] Hives    Elavil Other (comments)     Hallucinations. Seeing shadows and hearing voices.       Elavil Other (comments)     confusion    Statins-Hmg-Coa Reductase Inhibitors Other (comments)     Social History     Socioeconomic History    Marital status:      Spouse name: Not on file    Number of children: Not on file    Years of education: Not on file    Highest education level: Not on file   Occupational History    Not on file   Social Needs    Financial resource strain: Not on file    Food insecurity:     Worry: Not on file     Inability: Not on file    Transportation needs:     Medical: Not on file     Non-medical: Not on file   Tobacco Use    Smoking status: Former Smoker     Types: Cigarettes     Last attempt to quit: 1990     Years since quittin.7    Smokeless tobacco: Never Used   Substance and Sexual Activity    Alcohol use: Not Currently     Alcohol/week: 0.0 standard drinks     Comment: social    Drug use: No    Sexual activity: Yes     Partners: Female   Lifestyle    Physical activity:     Days per week: Not on file     Minutes per session: Not on file    Stress: Not on file   Relationships    Social connections:     Talks on phone: Not on file     Gets together: Not on file     Attends Yazidi service: Not on file     Active member of club or organization: Not on file     Attends meetings of clubs or organizations: Not on file     Relationship status: Not on file    Intimate partner violence:     Fear of current or ex partner: Not on file     Emotionally abused: Not on file     Physically abused: Not on file     Forced sexual activity: Not on file   Other Topics Concern    Not on file   Social History Narrative    ** Merged History Encounter **            Objective:     Visit Vitals  /88   Pulse 100   Temp 98.6 °F (37 °C)   Resp 18   Ht 5' 6\" (1.676 m)   Wt 245 lb (111.1 kg)   SpO2 95%   BMI 39.54 kg/m²       General: Alert and oriented and in no acute distress. Responds to all questions appropriately. LUNGS: Respirations unlabored. Skin: No obvious rash.   MSK:    Posture: Normal   Deformity: None    ROM:     Flexion: Normal    Extension: Normal     Lateral bending: Normal      Gait: Normal with cane     Palpation:    L1-L5: No tenderness    Sacrum: No tenderness    Coccyx: No tenderness    Left Paraspinal: TTP    Right Paraspinal: No tenderness     Strength (0-5/5)    Hip Flexion:   Left: 5/5  Right: 5/5    Hip Extension:  Left: 5/5  Right: 5/5    Hip Abduction:  Left: 5/5  Right: 5/5    Hip Adduction:  Left: 5/5  Right: 5/5    Knee Extension:  Left: 5/5  Right: 5/5    Knee Flexion:   Left: 5/5  Right: 5/5    Ankle dorsiflexion:  Left: 5/5  Right: 5/5    Ankle plantarflexion:  Left: 5/5  Right: 5/5    Great toe extension:  Left: 5/5  Right: 5/5     Sensation: Intact, no deficits DTR:    Patella:  Left: +2  Right: +2    Achilles:  Left: +2  Right: +2     Special test:    Straight leg: Left: Negative  Right: Negative    Cammys: Left: Negative  Right: Negative    Piriformis: Left: Positive  Right: Negative               Assessment:       ICD-10-CM ICD-9-CM    1. Lumbar paraspinal muscle spasm M62.830 724.8 naproxen (NAPROSYN) 500 mg tablet   2. Piriformis syndrome of left side G57.02 355.0 naproxen (NAPROSYN) 500 mg tablet       Symptoms suggestive of Piriformis syndrome as well as Lumbar back paraspinal muscle spasms. No neurological red flags or trauma. Discussed treatment options. Will continue with home stretching. Short course of Naproxen. He has Methocarbamol at home and will start taking to see if he gets relief. Home exercises. Discussed signs and symptoms of when to RTC or go to the ED. Plan:   1. Home Exercise Program as per handout. 2. Ice 15 minutes, three times a day PRN and after exercise. Can alternate with heat for 15 minutes. Medications:    1. Naproxen (Aleve): 500mg oral  twice a day PRN. 2. Acetaminophen (Tylenol):  500mg 1-2 tablets every 6 hours as needed for pain. RTC: 4-6 weeks    Marc Parsons DO  Sports Medicine Fellow.

## 2020-02-11 ENCOUNTER — HOSPITAL ENCOUNTER (OUTPATIENT)
Dept: LAB | Age: 55
Discharge: HOME | End: 2020-02-11

## 2020-02-11 ENCOUNTER — OFFICE VISIT (OUTPATIENT)
Dept: FAMILY MEDICINE CLINIC | Age: 55
End: 2020-02-11

## 2020-02-11 VITALS
DIASTOLIC BLOOD PRESSURE: 90 MMHG | WEIGHT: 236 LBS | RESPIRATION RATE: 18 BRPM | HEIGHT: 66 IN | TEMPERATURE: 98.1 F | SYSTOLIC BLOOD PRESSURE: 131 MMHG | OXYGEN SATURATION: 95 % | BODY MASS INDEX: 37.93 KG/M2 | HEART RATE: 100 BPM

## 2020-02-11 DIAGNOSIS — E11.65 UNCONTROLLED TYPE 2 DIABETES MELLITUS WITH HYPERGLYCEMIA (HCC): ICD-10-CM

## 2020-02-11 DIAGNOSIS — I42.0 CARDIOMYOPATHY, DILATED, NONISCHEMIC (HCC): ICD-10-CM

## 2020-02-11 DIAGNOSIS — I10 ESSENTIAL HYPERTENSION: ICD-10-CM

## 2020-02-11 DIAGNOSIS — E78.5 DYSLIPIDEMIA: ICD-10-CM

## 2020-02-11 LAB
ALBUMIN SERPL-MCNC: 3.6 G/DL (ref 3.5–5)
ALBUMIN UR QL STRIP: 10 MG/L
ALBUMIN/GLOB SERPL: 1 {RATIO} (ref 1.1–2.2)
ALP SERPL-CCNC: 152 U/L (ref 45–117)
ALT SERPL-CCNC: 32 U/L (ref 12–78)
ANION GAP SERPL CALC-SCNC: 6 MMOL/L (ref 5–15)
AST SERPL-CCNC: 12 U/L (ref 15–37)
BILIRUB SERPL-MCNC: 0.6 MG/DL (ref 0.2–1)
BUN SERPL-MCNC: 11 MG/DL (ref 6–20)
BUN/CREAT SERPL: 10 (ref 12–20)
CALCIUM SERPL-MCNC: 9.7 MG/DL (ref 8.5–10.1)
CHLORIDE SERPL-SCNC: 101 MMOL/L (ref 97–108)
CHOLEST SERPL-MCNC: 290 MG/DL
CO2 SERPL-SCNC: 26 MMOL/L (ref 21–32)
CREAT SERPL-MCNC: 1.12 MG/DL (ref 0.7–1.3)
CREATININE, URINE POC: 50 MG/DL
EST. AVERAGE GLUCOSE BLD GHB EST-MCNC: 295 MG/DL
GLOBULIN SER CALC-MCNC: 3.6 G/DL (ref 2–4)
GLUCOSE SERPL-MCNC: 463 MG/DL (ref 65–100)
HBA1C MFR BLD: 11.9 % (ref 4–5.6)
HDLC SERPL-MCNC: 32 MG/DL
HDLC SERPL: 9.1 {RATIO} (ref 0–5)
LDLC SERPL CALC-MCNC: ABNORMAL MG/DL (ref 0–100)
LDLC SERPL DIRECT ASSAY-MCNC: 177 MG/DL (ref 0–100)
LIPID PROFILE,FLP: ABNORMAL
MICROALBUMIN/CREAT RATIO POC: <30 MG/G
POTASSIUM SERPL-SCNC: 4.8 MMOL/L (ref 3.5–5.1)
PROT SERPL-MCNC: 7.2 G/DL (ref 6.4–8.2)
SODIUM SERPL-SCNC: 133 MMOL/L (ref 136–145)
TRIGL SERPL-MCNC: 677 MG/DL (ref ?–150)
VLDLC SERPL CALC-MCNC: ABNORMAL MG/DL

## 2020-02-11 RX ORDER — METFORMIN HYDROCHLORIDE 500 MG/1
500 TABLET ORAL 2 TIMES DAILY WITH MEALS
Qty: 180 TAB | Refills: 1 | Status: CANCELLED | OUTPATIENT
Start: 2020-02-11

## 2020-02-11 RX ORDER — METFORMIN HYDROCHLORIDE 500 MG/1
1000 TABLET, EXTENDED RELEASE ORAL 2 TIMES DAILY
Qty: 180 TAB | Refills: 1 | Status: SHIPPED | OUTPATIENT
Start: 2020-02-11 | End: 2020-04-14 | Stop reason: SDUPTHER

## 2020-02-11 RX ORDER — GLIPIZIDE 5 MG/1
5 TABLET ORAL 2 TIMES DAILY
Qty: 180 TAB | Refills: 1 | Status: SHIPPED | OUTPATIENT
Start: 2020-02-11

## 2020-02-11 RX ORDER — EZETIMIBE 10 MG/1
10 TABLET ORAL DAILY
Qty: 90 TAB | Refills: 1 | Status: SHIPPED | OUTPATIENT
Start: 2020-02-11

## 2020-02-11 RX ORDER — LISINOPRIL 40 MG/1
40 TABLET ORAL DAILY
Qty: 90 TAB | Refills: 1 | Status: SHIPPED | OUTPATIENT
Start: 2020-02-11

## 2020-02-11 RX ORDER — METOPROLOL TARTRATE 100 MG/1
100 TABLET ORAL 2 TIMES DAILY
Qty: 180 TAB | Refills: 1 | Status: SHIPPED | OUTPATIENT
Start: 2020-02-11

## 2020-02-11 NOTE — PROGRESS NOTES
Identified Patient with two Patient identifiers (Name and ). Two Patient Identifiers confirmed. Reviewed record in preparation for visit and have obtained necessary documentation. Chief Complaint   Patient presents with    Hypertension     follow up and medication refill    Diabetes     follow up and medication refill       Visit Vitals  /90 (BP 1 Location: Right arm, BP Patient Position: Sitting)   Pulse 100   Temp 98.1 °F (36.7 °C) (Oral)   Resp 18   Ht 5' 6\" (1.676 m)   Wt 236 lb (107 kg)   SpO2 95%   BMI 38.09 kg/m²       1. Have you been to the ER, urgent care clinic since your last visit? Hospitalized since your last visit? No    2. Have you seen or consulted any other health care providers outside of the [address] [place] [address] since your last visit? Include any pap smears or colon screening.  No

## 2020-02-11 NOTE — PROGRESS NOTES
[address] [facility] [address] [address]   Office [phone], Fax [phone]      Subjective:     Chief Complaint   Patient presents with    Hypertension     follow up and medication refill    Diabetes     follow up and medication refill        HPI:  John Gaming is a 47 y.o. OTHER male  presenting for medication refill. Type 2 Diabetes:   Was diagnosed with diabetes 3 years ago. currently on Metformin and glipizde. Had stopped taking Jardiance due to cost, does not remember when he stopped taking it. Reports polydipsia but denies polyuria  Home BG readings: None, afraid of needles and states that he does not want to check his BG. Patient on Statin- NO can not tolerate statin but was placed on Zetia, which he currently out of. Patient on ACE- yes  Diet:  Does not follow low carb diet, he eats frozen food and what his wife cooks at home  Exercise -None, he walks sometimes   Blood pressure today - 131/90  Last HbA1c 10.9 on 02/2019   Last urine microalbulmin- <30 on 03/2019  Last eye exam: 2019?,  No records avaialbel   Last monofilament exam: 3/4/2019        1. Hyperetsion:  BP : controlled with current medication regimen. Current medication include: Meroprolol and Lisnopril   Home medication regimen: Compliant with medications. Side effects:No  Home BP readings: checks once a week. 120/80s  BP today:131/90  Diet: frozen food, and chicken, white   Exercise:walking in the gym   Denies chest pain, SOB, leg swelling        3. Gout: Stable. Last gout attack on 09/2019. He is currently taking Allopurinol and naproxen       4. History of cardiomyopathy: No recent exacerbation. Denies sob or lower extremity edema. Currently on metoprolol and lisnopril. Last echo on 03/2019 with EF of 56-60% NRWA. Follows up with cardiology. Next appointment on 04/2019    Denies smoking cigarettes. Drinks beer occasionally. Review of Systems   Constitutional: Negative for chills and fever.    Eyes: Negative for blurred vision. Respiratory: Negative for shortness of breath. Cardiovascular: Negative for chest pain. Gastrointestinal: Negative for abdominal pain, nausea and vomiting. Genitourinary: Negative for dysuria and urgency. Neurological: Negative for dizziness, tingling and focal weakness. Past Medical History:   Diagnosis Date    Arthritis     gout    Cardiomyopathy ([facility] [address]     LVEF was reported 30% in 2013; normal LVEF 3/19     Gout     Hypercholesterolemia     Hypertension     Obesity     Obstructive sleep apnea (adult) (pediatric) 11/30/2012    could not tolerate CPAP     Other ill-defined conditions(799.89)     LE varicosities, hyperlipidemia    Sciatica     Unspecified essential hypertension 11/30/2012       Current Outpatient Medications on File Prior to Visit   Medication Sig Dispense Refill    naproxen (NAPROSYN) 500 mg tablet Take 1 Tab by mouth two (2) times daily (with meals). 30 Tab 0    metFORMIN (GLUCOPHAGE) 500 mg tablet Take 1 Tab by mouth two (2) times daily (with meals). 180 Tab 1    lisinopril (PRINIVIL, ZESTRIL) 40 mg tablet Take 1 Tab by mouth daily. 90 Tab 1    metoprolol tartrate (LOPRESSOR) 100 mg IR tablet Take 1 Tab by mouth two (2) times a day. 180 Tab 1    allopurinol (ZYLOPRIM) 300 mg tablet Take 300 mg by mouth daily.  glipiZIDE (GLUCOTROL) 5 mg tablet Take 1 Tab by mouth two (2) times a day. 180 Tab 1    pantoprazole (PROTONIX) 40 mg tablet Take 1 Tab by mouth daily. 30 Tab 0    empagliflozin (JARDIANCE) 10 mg tablet Take 1 Tab by mouth daily. 90 Tab 1    ezetimibe (ZETIA) 10 mg tablet Take 1 Tab by mouth daily. 90 Tab 1    indomethacin (INDOCIN) 25 mg capsule 1-2 tid prn pain, take with food. 90 Cap 2     No current facility-administered medications on file prior to visit.         Allergies   Allergen Reactions    Diovan [Valsartan] Other (comments)     Boils & knots al over head & upper body    Diovan [Valsartan] Hives    Elavil Other (comments)     Hallucinations. Seeing shadows and hearing voices.       Elavil Other (comments)     confusion    Statins-Hmg-Coa Reductase Inhibitors Other (comments)       Past Surgical History:   Procedure Laterality Date    HX APPENDECTOMY      HX ORTHOPAEDIC      L gt toe       Social History     Socioeconomic History    Marital status:      Spouse name: Not on file    Number of children: Not on file    Years of education: Not on file    Highest education level: Not on file   Occupational History    Not on file   Social Needs    Financial resource strain: Not on file    Food insecurity:     Worry: Not on file     Inability: Not on file    Transportation needs:     Medical: Not on file     Non-medical: Not on file   Tobacco Use    Smoking status: Former Smoker     Types: Cigarettes     Last attempt to quit: 1990     Years since quittin.1    Smokeless tobacco: Never Used   Substance and Sexual Activity    Alcohol use: Not Currently     Alcohol/week: 0.0 standard drinks     Comment: social    Drug use: No    Sexual activity: Yes     Partners: Female   Lifestyle    Physical activity:     Days per week: Not on file     Minutes per session: Not on file    Stress: Not on file   Relationships    Social connections:     Talks on phone: Not on file     Gets together: Not on file     Attends [employer] service: Not on file     Active member of club or organization: Not on file     Attends meetings of clubs or organizations: Not on file     Relationship status: Not on file    Intimate partner violence:     Fear of current or ex partner: Not on file     Emotionally abused: Not on file     Physically abused: Not on file     Forced sexual activity: Not on file   Other Topics Concern    Not on file   Social History Narrative    ** Merged History Encounter **            Patient Active Problem List   Diagnosis Code    Essential hypertension I10    Obstructive sleep apnea (adult) (pediatric) G47.33    Gout M10.9    Hyperlipidemia E78.5    Cardiomyopathy, dilated, nonischemic ([facility]) I42.0    Prediabetes R73.03    Avascular necrosis of bone of left hip ([facility]) M87.052    Severe obesity ([facility]) E66.01    Uncontrolled type 2 diabetes mellitus with hyperglycemia ([facility]) E11.65    Hypertriglyceridemia E78.1         Objective:   Vitals - reviewed  Visit Vitals  /90 (BP 1 Location: Right arm, BP Patient Position: Sitting)   Pulse 100   Temp 98.1 °F (36.7 °C) (Oral)   Resp 18   Ht 5' 6\" (1.676 m)   Wt 236 lb (107 kg)   SpO2 95%   BMI 38.09 kg/m²        Physical Exam  Constitutional:       Appearance: Normal appearance. Cardiovascular:      Rate and Rhythm: Normal rate and regular rhythm. Heart sounds: No murmur. No gallop. Pulmonary:      Effort: Pulmonary effort is normal. No respiratory distress. Breath sounds: Normal breath sounds. No wheezing. Abdominal:      General: Bowel sounds are normal. There is no distension. Palpations: Abdomen is soft. Tenderness: There is no abdominal tenderness. Musculoskeletal: Normal range of motion. General: No swelling or tenderness. Right lower leg: No edema. Left lower leg: No edema. Neurological:      Mental Status: He is alert. Diabetic foot exam:     Left: Intact vibratory sensation     Intact sensation to monofilament 4/4 locations   2+ DP pulse   No foot deformities   No callus formation  Right: Intact vibratory sensation     Intact sensation to monofilament 4/4 locations   2+ DP pulse   No foot deformities   No callus formation      Assessment and orders:       ICD-10-CM ICD-9-CM    1.  Uncontrolled type 2 diabetes mellitus with hyperglycemia ([facility]) E11.65 250.02 lisinopril (PRINIVIL, ZESTRIL) 40 mg tablet      ezetimibe (ZETIA) 10 mg tablet      glipiZIDE (GLUCOTROL) 5 mg tablet      METABOLIC PANEL, COMPREHENSIVE      HEMOGLOBIN A1C WITH EAG      AMB POC URINE, MICROALBUMIN, SEMIQUANT (3 RESULTS)   2. Cardiomyopathy, dilated, nonischemic (HCC) I42.0 425.4 metoprolol tartrate (LOPRESSOR) 100 mg IR tablet   3. Essential hypertension I10 401.9 lisinopril (PRINIVIL, ZESTRIL) 40 mg tablet   4. Dyslipidemia E78.5 272.4 ezetimibe (ZETIA) 10 mg tablet      LIPID PANEL     Diagnoses and all orders for this visit:    1. Uncontrolled type 2 diabetes mellitus with hyperglycemia ([facility] [address]): Non compliant. Does not check BG ( afraid of needles). Last A1C 10.9 on 03/2019 and lost to follow up. Also has poor diet. Discussed at length the importance of glycemic control, low carb diet, exercise and close follow up with pcp. Follow up with ophthalmology for eye exam. Will check labs today. Refill provided for metformin and glipizide. Freestyle nathan script sent to pharmacy as well. -     lisinopril (PRINIVIL, ZESTRIL) 40 mg tablet; Take 1 Tab by mouth daily. -     ezetimibe (ZETIA) 10 mg tablet; Take 1 Tab by mouth daily.  -     glipiZIDE (GLUCOTROL) 5 mg tablet; Take 1 Tab by mouth two (2) times a day. -     METABOLIC PANEL, COMPREHENSIVE; Future  -     HEMOGLOBIN A1C WITH EAG; Future  -     AMB POC URINE, MICROALBUMIN, SEMIQUANT (3 RESULTS)    2. Cardiomyopathy, dilated, nonischemic (HCC):Stable. Asymptomatic. Last Echo on 03/2019 EF of 55-60% NRWA. Follows up with Cardiology. Has an appointment in 2 months. -     metoprolol tartrate (LOPRESSOR) 100 mg IR tablet; Take 1 Tab by mouth two (2) times a day. 3. Essential hypertension: BP today 131/90. Compliant with meds. Advised to check BP 3-4 times a week. -     lisinopril (PRINIVIL, ZESTRIL) 40 mg tablet; Take 1 Tab by mouth daily. 4. Dyslipidemia: Did not tolerate statin previously- had rash/listers. On Zetia. Check Lipid panel today. Re diet, exercise and lifestyle modifications. -     ezetimibe (ZETIA) 10 mg tablet; Take 1 Tab by mouth daily.  -     LIPID PANEL; Future    Other orders  -     metFORMIN ER (GLUCOPHAGE XR) 500 mg tablet;  Take 2 Tabs by mouth two (2) times a day. -     flash glucose sensor (FREESTYLE BRO 14 DAY SENSOR) kit; Check your blood sugars 4 times a day        Pt was discussed with Dr. Suzanne Salazar (attending physician). I have reviewed patient medical and social history and medications. I have reviewed pertinent labs results and other data. I have discussed the diagnosis with the patient and the intended plan as seen in the above orders. The patient has received an after-visit summary and questions were answered concerning future plans. I have discussed medication side effects and warnings with the patient as well.     Jessica Neri MD- [address] [facility]  02/11/20

## 2020-02-11 NOTE — PATIENT INSTRUCTIONS
Aprenda acerca de las pautas alimentarias para diabetes - [ Learning About Diabetes Food Guidelines ]  Instrucciones de cuidado    La planificación de las comidas es importante para el manejo de la diabetes. Ayuda a mantener el azúcar en la curly en el nivel ideal (que usted fija con adams médico). Usted no tiene que comer alimentos especiales. Puede comer lo mismo que adams phuc, incluso dulces de vez en cuando. Arpit debe prestar atención a la cantidad y la frecuencia con que come ciertos alimentos. Pranay vez desee colaborar con un dietista o educador de diabetes certificado (CDE, por olvin siglas en inglés) para que le ayuden a planificar las comidas y los refrigerios. Un dietista o CDE también puede ayudarle a bajar de peso si leslye es sharon de olvin objetivos. ¿Qué debería saber acerca de ingerir carbohidratos? Manejar la cantidad de carbohidratos que ingiere es rafa parte importante de la alimentación saludable cuando tiene diabetes. Los carbohidratos se encuentran en muchos alimentos. · Sepa qué alimentos contienen carbohidratos. Y aprenda qué cantidad de carbohidratos contienen los diferentes alimentos. ? El pan, los cereales, la pasta y el arroz tienen aproximadamente 15 gramos de carbohidratos por porción. Rafa porción equivale a 1 rebanada de pan (1 onza o 28 g), ½ taza de cereal cocido o 1/3 de taza de pasta o arroz cocidos. ? Las frutas tienen 15 gramos de carbohidratos por porción. Marito Ar porción es 1 fruta fresca pequeña, jack rafa [place] o rafa naranja; ½ banana (plátano); ½ taza de fruta cocida o enlatada; ½ taza de jugo de fruta; 1 taza de melón o frambuesas; o 2 cucharadas de frutas secas. ? Rinda Actis y el yogur sin azúcar agregado tienen 15 gramos de carbohidratos por porción. Marito Ar porción es 1 taza de [place] o 2/3 taza de yogur sin azúcar agregado. ? Las verduras con almidón tienen 15 gramos de carbohidratos por porción.  Marito Ar porción es ½ taza de puré de papa o camote (batata, ninaiatgeovani); 1 taza de calabacín; ½ papa horneada pequeña; ½ taza de frijoles cocidos; o ½ taza de maíz (elote) o arvejas (chícharos) cocidos. · Aprenda cuántos carbohidratos debe consumir cada día y en cada comida. Un dietista o CDE le puede enseñar cómo llevar la cuenta de los carbohidratos que consume. A esto se le llama recuento de carbohidratos. · Si no está seguro de cómo [place] gramos de carbohidratos, utilice el Método del [place] para planificar las comidas. Es rafa [place] Muta y rápida de asegurarse de que consuma comidas equilibradas. También le ayuda a distribuir los carbohidratos larry el día. ? Divida el plato por tipo de alimento. Llene medio plato con verduras sin almidón, ponga carne u otras proteínas en rafa cuarta parte del plato y granos o verduras con almidón en el último cuarto del plato. A esto puede agregarle un pequeño pedazo de fruta y 3 taza de [address] o yogur, según la cantidad de carbohidratos que deba consumir en rafa comida. · Trate de comer aproximadamente la misma cantidad de carbohidratos en cada comida. No \"reserve\" adams cantidad diaria de carbohidratos para consumirlos en rafa genna comida. · Las proteínas contienen muy pocos o nada de carbohidratos por porción. Los ejemplos de proteínas incluyen carne de res, [place], [place], [place], [place], tofu, [place], requesón (\"cottage cheese\") y la mantequilla de cacahuate [place]). Rafa porción de carne son 3 onzas (85 g), lo cual es aproximadamente del tamaño de rafa baraja de naipes. Los ejemplos de porciones de sustitutos de la carne (equivalente a 1 onza o 28 g de carne) son 1/4 de taza de requesón, 1 huevo, 1 cucharada de [place] de cacahuate y ½ taza de tofu. ¿Cómo puede comer fuera y aún así comer de modo saludable? · Aprenda a calcular los tamaños de las porciones de alimentos que contienen carbohidratos. Si mide la comida en casa, será más fácil calcular la cantidad en rafa porción de comida de restaurante.   · Si el platillo que pide contiene demasiados carbohidratos (jack tamra, maíz o frijoles al horno), pida un alimento bajo en carbohidratos en nelson lugar. Pida rafa ensalada o verduras. · Si [place] insulina, revise nelson azúcar en la curly antes y después de comer fuera para ayudarle a planear cuánto comer en el futuro. · Si usted come más carbohidratos de lo planeado en rafa comida, dé un paseo o jon otro tipo de ejercicio. [place] ayudará a reducir el azúcar en la curly. ¿Qué más debería saber? · Limite las grasas saturadas, jack la grasa de la carne y productos lácteos. Esta es rafa opción saludable, porque las personas que tienen diabetes tienen un mayor riesgo de enfermedades del corazón. Así que elija hines magros de carne y productos lácteos descremados o semidescremados. Utilice aceite de johnson o de canola en lugar de mantequilla o manteca al cocinar. · No se salte comidas. Nelson nivel de azúcar en la curly puede bajar demasiado si usted se salta comidas y diane insulina o ciertos medicamentos para la diabetes. · Consulte con nelson médico antes de beber alcohol. El alcohol puede hacer que nelson azúcar en la curly baje demasiado. El alcohol también puede causar rafa reacción adversa si usted diane ciertos medicamentos para la diabetes. La atención de seguimiento es rafa parte clave de nelson tratamiento y seguridad. Asegúrese de hacer y acudir a todas las citas, y llame a nelson médico si está teniendo problemas. También es rafa buena idea saber los resultados de olvin exámenes y mantener rafa lista de los medicamentos que diane. ¿Dónde puede encontrar más información en inglés? Chan Pearling a http://deon-promise.info/. Kathryn Mays [id number] en la búsqueda para aprender más acerca de \"Aprenda acerca de las pautas alimentarias para diabetes - [ Learning About Diabetes Food Guidelines ]. \"  Revisado: 16 smooth, 2019  Versión del contenido: 12.2  © 8787-0818 [employer].  Las instrucciones de cuidado fueron adaptadas bajo licencia por [employer] (which disclaims liability or warranty for this information). Si usted tiene [place] afección médica o sobre estas instrucciones, siempre pregunte a adams profesional de reid. [facility] niega toda garantía o responsabilidad por adams uso de esta información.

## 2020-02-20 ENCOUNTER — APPOINTMENT (OUTPATIENT)
Dept: CT IMAGING | Age: 55
End: 2020-02-20
Attending: EMERGENCY MEDICINE
Payer: MEDICARE

## 2020-02-20 ENCOUNTER — HOSPITAL ENCOUNTER (EMERGENCY)
Age: 55
Discharge: HOME | End: 2020-02-20
Attending: EMERGENCY MEDICINE
Payer: MEDICARE

## 2020-02-20 VITALS
OXYGEN SATURATION: 98 % | HEART RATE: 87 BPM | RESPIRATION RATE: 18 BRPM | SYSTOLIC BLOOD PRESSURE: 137 MMHG | HEIGHT: 66 IN | BODY MASS INDEX: 36.8 KG/M2 | TEMPERATURE: 98.3 F | DIASTOLIC BLOOD PRESSURE: 72 MMHG | WEIGHT: 229 LBS

## 2020-02-20 DIAGNOSIS — G43.909 MIGRAINE WITHOUT STATUS MIGRAINOSUS, NOT INTRACTABLE, UNSPECIFIED MIGRAINE TYPE: Primary | ICD-10-CM

## 2020-02-20 LAB
ALBUMIN SERPL-MCNC: 3.6 G/DL (ref 3.5–5)
ALBUMIN/GLOB SERPL: 1 {RATIO} (ref 1.1–2.2)
ALP SERPL-CCNC: 99 U/L (ref 45–117)
ALT SERPL-CCNC: 38 U/L (ref 12–78)
ANION GAP SERPL CALC-SCNC: 5 MMOL/L (ref 5–15)
AST SERPL-CCNC: 29 U/L (ref 15–37)
BASOPHILS # BLD: 0 K/UL (ref 0–0.1)
BASOPHILS NFR BLD: 0 % (ref 0–1)
BILIRUB SERPL-MCNC: 1 MG/DL (ref 0.2–1)
BUN SERPL-MCNC: 16 MG/DL (ref 6–20)
BUN/CREAT SERPL: 15 (ref 12–20)
CALCIUM SERPL-MCNC: 8.6 MG/DL (ref 8.5–10.1)
CHLORIDE SERPL-SCNC: 108 MMOL/L (ref 97–108)
CO2 SERPL-SCNC: 25 MMOL/L (ref 21–32)
CREAT SERPL-MCNC: 1.06 MG/DL (ref 0.7–1.3)
DIFFERENTIAL METHOD BLD: ABNORMAL
EOSINOPHIL # BLD: 0.2 K/UL (ref 0–0.4)
EOSINOPHIL NFR BLD: 1 % (ref 0–7)
ERYTHROCYTE [DISTWIDTH] IN BLOOD BY AUTOMATED COUNT: 11.4 % (ref 11.5–14.5)
GLOBULIN SER CALC-MCNC: 3.7 G/DL (ref 2–4)
GLUCOSE SERPL-MCNC: 110 MG/DL (ref 65–100)
HCT VFR BLD AUTO: 41.5 % (ref 36.6–50.3)
HGB BLD-MCNC: 13.7 G/DL (ref 12.1–17)
IMM GRANULOCYTES # BLD AUTO: 0 K/UL (ref 0–0.04)
IMM GRANULOCYTES NFR BLD AUTO: 0 % (ref 0–0.5)
LYMPHOCYTES # BLD: 3 K/UL (ref 0.8–3.5)
LYMPHOCYTES NFR BLD: 26 % (ref 12–49)
MCH RBC QN AUTO: 30.4 PG (ref 26–34)
MCHC RBC AUTO-ENTMCNC: 33 G/DL (ref 30–36.5)
MCV RBC AUTO: 92 FL (ref 80–99)
MONOCYTES # BLD: 0.7 K/UL (ref 0–1)
MONOCYTES NFR BLD: 7 % (ref 5–13)
NEUTS SEG # BLD: 7.3 K/UL (ref 1.8–8)
NEUTS SEG NFR BLD: 66 % (ref 32–75)
NRBC # BLD: 0 K/UL (ref 0–0.01)
NRBC BLD-RTO: 0 PER 100 WBC
PLATELET # BLD AUTO: 308 K/UL (ref 150–400)
PMV BLD AUTO: 10.9 FL (ref 8.9–12.9)
POTASSIUM SERPL-SCNC: 4.4 MMOL/L (ref 3.5–5.1)
PROT SERPL-MCNC: 7.3 G/DL (ref 6.4–8.2)
RBC # BLD AUTO: 4.51 M/UL (ref 4.1–5.7)
SODIUM SERPL-SCNC: 138 MMOL/L (ref 136–145)
TROPONIN I SERPL-MCNC: <0.05 NG/ML
WBC # BLD AUTO: 11.2 K/UL (ref 4.1–11.1)

## 2020-02-20 PROCEDURE — 85025 COMPLETE CBC W/AUTO DIFF WBC: CPT

## 2020-02-20 PROCEDURE — 80053 COMPREHEN METABOLIC PANEL: CPT

## 2020-02-20 PROCEDURE — 96375 TX/PRO/DX INJ NEW DRUG ADDON: CPT

## 2020-02-20 PROCEDURE — 84484 ASSAY OF TROPONIN QUANT: CPT

## 2020-02-20 PROCEDURE — 74011250636 HC RX REV CODE- 250/636: Performed by: EMERGENCY MEDICINE

## 2020-02-20 PROCEDURE — 70450 CT HEAD/BRAIN W/O DYE: CPT

## 2020-02-20 PROCEDURE — 96374 THER/PROPH/DIAG INJ IV PUSH: CPT

## 2020-02-20 PROCEDURE — 36415 COLL VENOUS BLD VENIPUNCTURE: CPT

## 2020-02-20 PROCEDURE — 99284 EMERGENCY DEPT VISIT MOD MDM: CPT

## 2020-02-20 RX ORDER — PROCHLORPERAZINE EDISYLATE 5 MG/ML
10 INJECTION INTRAMUSCULAR; INTRAVENOUS
Status: COMPLETED | OUTPATIENT
Start: 2020-02-20 | End: 2020-02-20

## 2020-02-20 RX ORDER — DIPHENHYDRAMINE HYDROCHLORIDE 50 MG/ML
25 INJECTION, SOLUTION INTRAMUSCULAR; INTRAVENOUS
Status: COMPLETED | OUTPATIENT
Start: 2020-02-20 | End: 2020-02-20

## 2020-02-20 RX ORDER — KETOROLAC TROMETHAMINE 30 MG/ML
15 INJECTION, SOLUTION INTRAMUSCULAR; INTRAVENOUS
Status: COMPLETED | OUTPATIENT
Start: 2020-02-20 | End: 2020-02-20

## 2020-02-20 RX ADMIN — KETOROLAC TROMETHAMINE 15 MG: 30 INJECTION, SOLUTION INTRAMUSCULAR at 16:14

## 2020-02-20 RX ADMIN — DIPHENHYDRAMINE HYDROCHLORIDE 25 MG: 50 INJECTION, SOLUTION INTRAMUSCULAR; INTRAVENOUS at 15:55

## 2020-02-20 RX ADMIN — PROCHLORPERAZINE EDISYLATE 10 MG: 5 INJECTION INTRAMUSCULAR; INTRAVENOUS at 15:55

## 2020-02-20 RX ADMIN — SODIUM CHLORIDE 1000 ML: 900 INJECTION, SOLUTION INTRAVENOUS at 15:55

## 2020-02-20 NOTE — ED PROVIDER NOTES
47 y.o. male with past medical history significant for arthritis, cardiomyopathy, gout, HLD, HTN, MCKENZIE, and, sciatica, who presents via POV with chief complaint of headache. Pt complains of a headache that began yesterday with accompanying blurred vision. He states his vision is blurred up close and far away even when he is wearing his glasses. He took Excedrin migraine with some relief. His headache is worse when he coughs, and he reports he started coughing yesterday as well. Pt also endorses current nausea and photophobia associated with his headache. He had a tooth extraction on Friday 2/14/20, and has been on a mostly liquid and soft diet, until yesterday when he introduced some solid foods. His PCP recently increased his dose of metformin, so he was concerned his diet change in combination with the medication change could be an issue. He also reports an episode of R facial tingling yesterday, with numbness radiating into his R arm and states that this lasted for a few minutes but has since resolved. Pt goggled his symptoms and was concerned for stroke. He denies hx of migraines. He denies vomiting, weakness, chest pain, shortness of breath, congestion, and sore throat. He saw his PCP last week and had blood work done which was unremarkable. There are no other acute medical concerns at this time. Social hx: former tobacco smoker (quit 1990); denies current EtOH use; denies illicit drug use. PCP: Dillon Paul MD      Note written by Ally Nur, as dictated by Carmel Canavan, DO 2:44 PM      The history is provided by the patient. No  was used.         Past Medical History:   Diagnosis Date    Arthritis     gout    Cardiomyopathy ([facility] [address]     LVEF was reported 30% in 2013; normal LVEF 3/19     Gout     Hypercholesterolemia     Hypertension     Obesity     Obstructive sleep apnea (adult) (pediatric) 11/30/2012    could not tolerate CPAP     Other ill-defined conditions(799.89)     LE varicosities, hyperlipidemia    Sciatica     Unspecified essential hypertension 2012       Past Surgical History:   Procedure Laterality Date    HX APPENDECTOMY      HX ORTHOPAEDIC      L gt toe         Family History:   Problem Relation Age of Onset    Diabetes Mother     Hypertension Mother        Social History     Socioeconomic History    Marital status:      Spouse name: Not on file    Number of children: Not on file    Years of education: Not on file    Highest education level: Not on file   Occupational History    Not on file   Social Needs    Financial resource strain: Not on file    Food insecurity:     Worry: Not on file     Inability: Not on file    Transportation needs:     Medical: Not on file     Non-medical: Not on file   Tobacco Use    Smoking status: Former Smoker     Types: Cigarettes     Last attempt to quit: 1990     Years since quittin.2    Smokeless tobacco: Never Used   Substance and Sexual Activity    Alcohol use: Not Currently     Alcohol/week: 0.0 standard drinks     Comment: social    Drug use: No    Sexual activity: Yes     Partners: Female   Lifestyle    Physical activity:     Days per week: Not on file     Minutes per session: Not on file    Stress: Not on file   Relationships    Social connections:     Talks on phone: Not on file     Gets together: Not on file     Attends Jainism service: Not on file     Active member of club or organization: Not on file     Attends meetings of clubs or organizations: Not on file     Relationship status: Not on file    Intimate partner violence:     Fear of current or ex partner: Not on file     Emotionally abused: Not on file     Physically abused: Not on file     Forced sexual activity: Not on file   Other Topics Concern    Not on file   Social History Narrative    ** Merged History Encounter **              ALLERGIES: Diovan [valsartan]; Diovan [valsartan];  Elavil; Elavil; and Statins-hmg-coa reductase inhibitors    Review of Systems   Constitutional: Negative for activity change, appetite change, chills and fever. HENT: Negative for congestion, rhinorrhea, sinus pain, sneezing and sore throat. Eyes: Positive for photophobia and visual disturbance. Respiratory: Positive for cough. Negative for shortness of breath. Cardiovascular: Negative for chest pain. Gastrointestinal: Positive for nausea. Negative for abdominal pain, blood in stool, constipation, diarrhea and vomiting. Genitourinary: Negative for difficulty urinating, dysuria, flank pain, hematuria, penile pain and testicular pain. Musculoskeletal: Negative for arthralgias, back pain, myalgias and neck pain. Skin: Negative for rash and wound. Neurological: Positive for numbness and headaches. Negative for syncope, weakness and light-headedness. Psychiatric/Behavioral: Negative for self-injury and suicidal ideas. All other systems reviewed and are negative. Vitals:    02/20/20 1424   BP: (!) 157/101   Pulse: (!) 111   Resp: 16   Temp: 98.1 °F (36.7 °C)   SpO2: 98%   Weight: 103.9 kg (229 lb)   Height: 5' 6\" (1.676 m)            Physical Exam  Vitals signs and nursing note reviewed. Constitutional:       General: He is not in acute distress. Appearance: Normal appearance. He is well-developed. He is not diaphoretic. Comments: Pleasant in NAD, shielding his eyes from light slightly with a hat. HENT:      Head: Normocephalic and atraumatic. Nose: Nose normal.   Eyes:      Extraocular Movements: Extraocular movements intact. Conjunctiva/sclera: Conjunctivae normal.      Pupils: Pupils are equal, round, and reactive to light. Neck:      Musculoskeletal: Neck supple. Cardiovascular:      Rate and Rhythm: Normal rate and regular rhythm. Heart sounds: Normal heart sounds. Pulmonary:      Effort: Pulmonary effort is normal.      Breath sounds: Normal breath sounds.    Abdominal: General: There is no distension. Palpations: Abdomen is soft. Tenderness: There is no abdominal tenderness. Musculoskeletal:         General: No tenderness. Skin:     General: Skin is warm and dry. Neurological:      General: No focal deficit present. Mental Status: He is alert and oriented to person, place, and time. Cranial Nerves: No cranial nerve deficit. Sensory: No sensory deficit. Motor: No weakness. Coordination: Coordination normal.      Comments: Intact sensation, 5/5 strength in all 4 extremities, intact finger to nose, neg pronator drift, fluent speech, CN intact. MDM   54-year-old male presents with headache, photophobia. Neuro intact, as above. Patient is afebrile with vital signs stable no acute distress. Labs returned showing no significant abnormalities. CT head shows no acute abnormalities. He was given migraine cocktail and had improvement in his symptoms. Feel that symptoms likely secondary to migraine headache. He was recommended rest, hydration, neurology follow-up for further evaluation of his headaches as needed. Return precautions were given for worsening or concerns.     Procedures

## 2020-02-20 NOTE — ED NOTES
Patient discharged from ED by provider. Discharge instructions reviewed with patient and all questions answered. Patient ambulatory from ED in [facility].

## 2020-02-25 ENCOUNTER — OFFICE VISIT (OUTPATIENT)
Dept: FAMILY MEDICINE CLINIC | Age: 55
End: 2020-02-25

## 2020-02-25 VITALS
OXYGEN SATURATION: 100 % | TEMPERATURE: 97.9 F | HEIGHT: 66 IN | RESPIRATION RATE: 18 BRPM | WEIGHT: 237 LBS | BODY MASS INDEX: 38.09 KG/M2 | SYSTOLIC BLOOD PRESSURE: 124 MMHG | HEART RATE: 69 BPM | DIASTOLIC BLOOD PRESSURE: 84 MMHG

## 2020-02-25 DIAGNOSIS — E11.65 UNCONTROLLED TYPE 2 DIABETES MELLITUS WITH HYPERGLYCEMIA (HCC): Primary | ICD-10-CM

## 2020-02-25 DIAGNOSIS — E78.5 DYSLIPIDEMIA: ICD-10-CM

## 2020-02-25 DIAGNOSIS — J02.9 SORE THROAT: ICD-10-CM

## 2020-02-25 DIAGNOSIS — J02.0 STREP PHARYNGITIS: ICD-10-CM

## 2020-02-25 DIAGNOSIS — G43.819 OTHER MIGRAINE WITHOUT STATUS MIGRAINOSUS, INTRACTABLE: ICD-10-CM

## 2020-02-25 LAB
S PYO AG THROAT QL: POSITIVE
VALID INTERNAL CONTROL?: YES

## 2020-02-25 RX ORDER — AMOXICILLIN 500 MG/1
500 CAPSULE ORAL 2 TIMES DAILY
Qty: 20 CAP | Refills: 0 | Status: SHIPPED | OUTPATIENT
Start: 2020-02-25 | End: 2020-03-06

## 2020-02-25 NOTE — PATIENT INSTRUCTIONS
Recuento de carbohidratos cuando [place] insulina: Instrucciones de cuidado - [ Counting Carbohydrates: Care Instructions ]  Instrucciones de cuidado    Usted no tiene que comer alimentos especiales cuando tiene diabetes. Solo tiene que tener cuidado y comer alimentos saludables. Los carbohidratos aumentan el nivel de azúcar en la curly más y con mayor rapidez que cualquier otro nutriente. Los carbohidratos se encuentran en postres, panes y cereales, y en frutas. También se encuentran en verduras con almidón. Estas incluyen las tamra, el maíz (elote), y granos jack el arroz y la pasta. Los carbohidratos también se encuentran en la [employer] y el yogur. Mientras mayor cantidad de carbohidratos consuma en rafa genna comida, más aumentará adams nivel de azúcar en la curly. Distribuir el consumo de carbohidratos a lo lynne del día le ayuda a mantener el azúcar en la curly en los límites ideales para usted. Si usted [place] insulina, contar carbohidratos le ayuda a adecuar la dosis de insulina a la cantidad de gramos de carbohidratos que consume en rafa comida. De leslye modo, usted puede cambiar lo que come y la dosis de insulina de acuerdo a olvin necesidades. Revisarse el nivel de azúcar en la curly varias veces al día puede ayudarle a saber cómo los carbohidratos afectan adams nivel de azúcar en la curly. Un dietista o educador de diabetes certificado puede ayudarle a planificar las comidas y los refrigerios. La atención de seguimiento es rafa parte clave de adams tratamiento y seguridad. Asegúrese de hacer y acudir a todas las citas, y llame a adams médico si está teniendo problemas. También es arfa buena idea saber los resultados de olvin exámenes y mantener rafa lista de los medicamentos que diane. ¿Cómo puede cuidarse en el hogar?   Sepa cuál es adams cantidad diaria de carbohidratos  Adams cantidad diaria depende de varias cosas, tales jack adams peso, adams nivel de [place], los medicamentos para la diabetes que diane y olvin metas en cuanto a olvin niveles de azúcar en la curly. Un dietista registrado o educador de diabetes autorizado puede ayudarle a planificar la cantidad de carbohidratos que debe incluir en cada comida y refrigerio. Para la mayoría de los adultos rafa sterling para la cantidad diaria de carbohidratos es:  · 45 a 60 gramos en cada comida. Rowan equivale a aproximadamente 3 o 4 porciones de carbohidratos. · 15 a 20 gramos en cada refrigerio. [place] equivale aproximadamente a 1 porción de carbohidratos. Recuento de carbohidratos  Contar carbohidratos le permite saber cuánta insulina de acción rápida necesita administrarse antes de comer. Si usted [place] rafa bomba de [facility], usted obtiene rafa cantidad jose maria de insulina larry el día. Entonces, la bomba debe [facility] comidas. [employer], le suministra más insulina y cubre el aumento de azúcar en la curly después de las comidas. Si [place] insulina:  · [place]e cuál es adams propia proporción de insulina y carbohidratos. Usted y adams profesional de la reid especializado en diabetes determinarán la proporción. Pueden hacerlo midiendo el azúcar en la curly después de las comidas. Por ejemplo, es posible que usted necesite rafa dosis de insulina determinada por cada 15 gramos de carbohidratos. · Calcule la cantidad total de gramos de carbohidratos que contiene rafa comida. Así puede calcular la cantidad de unidades de insulina que debe administrarse tomando jack base adams propia proporción de insulina y carbohidratos. · Hacer ejercicio disminuye el nivel de azúcar en la curly. Usted puede usar menos insulina de la que usaría si no hiciera ejercicio. Tenga en cuenta que los horarios son importantes. Si usted hace ejercicio 1 hora después de leo comido, es posible que adams cuerpo necesite menos insulina que si lo hace 3 horas después de la comida. Mida adams nivel de azúcar en la curly para saber cómo afecta el ejercicio adams necesidad de insulina.   Si [place] o no [place] insulina:  · Hazel las etiquetas de los alimentos envasados. Así puede saber la cantidad de carbohidratos que contiene rafa porción. También puede usar guías de la Asociación Americana de Diabetes (American Diabetes Association). · [address] porciones. Si un paquete contiene dos porciones y usted consume el paquete completo, tendrá que [place] cantidad de gramos de carbohidratos indicada para rafa porción. · Las proteínas, grasas y fibras no elevan tanto el nivel de azúcar en la curly jack lo hacen los carbohidratos. Si consume muchos de estos nutrientes en rafa comida, el azúcar en la curly aumentará con mayor lentitud de lo que lo haría de Reinaldo u Cresencio. Coma de todos los grupos de alimentos  · Coma al menos dewey comidas al día. · Planifique olvin comidas para que incluyan alimentos de todos los grupos alimentarios. Los grupos de alimentos incluyen granos, frutas, productos lácteos, proteínas y verduras. · Consulte a adams dietista o educador de diabetes sobre las Cee de añadir cantidades limitadas de dulces a adams plan de alimentación. · Si cristofer alcohol, hable con adams médico. Podría no ser recomendable si diaen determinados medicamentos para la diabetes. ¿Dónde puede encontrar más información en inglés? Jazlyn Echavarria a http://deon-promise.info/. Henry Multani K902 en la búsqueda para aprender más acerca de \"Recuento de carbohidratos cuando [place] insulina: Instrucciones de cuidado - [ Counting Carbohydrates: Care Instructions ]. \"  Revisado: 16 smooth, 2019  Versión del contenido: 12.2  © 3836-3252 [employer], Inc[employer]. Las instrucciones de cuidado fueron adaptadas bajo licencia por [employer] (which disclaims liability or warranty for this information). Si usted tiene [place] afección médica o sobre estas instrucciones, siempre pregunte a adams profesional de reid. [facility], [facility]orporated niega toda garantía o responsabilidad por adams uso de esta información.

## 2020-02-25 NOTE — PROGRESS NOTES
[address] [facility] [place]    Office [phone], Fax [phone]      Subjective:     Chief Complaint   Patient presents with   [facility] ED Follow-up     from [address] [place] [address] last week for elevated B/P. c/o of congestion with sore throat    Results     from last visit ,pt would like to go over        HPI:  Abelardo Thomas is a 47 y.o. OTHER male  presenting for ER follow up. Patient was seen in the ER 5 days ago for headache and blurry vision. His neuro exam was normal. Patient's vital signs were normal. Troponin was negative. Head CT was negative. He was given migraine cocktail and his symptoms improved. He was referred to neurology and has not followed up with neurology yet. Jimi Nunez,  he reports his headache is better and no longer has one sided weakness. Still needs to see his ophthalmologist as his glasses need renewal.     Type 2 DM : Not well controlled. Refusing to check his  BG at home and freestyle [place] is expensive for him to afford. A1C was 11.9.- 2 weeks ago. Patient declined starting insulin. Patient currently on Metfromin 1000 BID and Glipizide 5 mg BID. Last ophtalmology exam was one year ago. Denies polyuria or polydipsia or blurry vision. HLD: Patient did not tolerate statin in the past - Rash and blisters.  and restarted taking Zeita and doing well. Sore throat x 2 days. Denies any fever. chills, nause or vomiting. Reports cough with clear mucus production. Patient reports good appetite. Patient reports his grand child t is sick at home. Has been taking Royal. Review of Systems   Constitutional: Negative for chills and fever. HENT: Positive for sore throat. Respiratory: Positive for cough. Negative for shortness of breath. Cardiovascular: Negative for chest pain and palpitations. Genitourinary: Negative for frequency and urgency. Neurological: Positive for headaches. Negative for focal weakness and loss of consciousness.        Past Medical History:   Diagnosis Date    Arthritis     gout    Cardiomyopathy ([facility] [address]     LVEF was reported 30% in 2013; normal LVEF 3/19     Gout     Hypercholesterolemia     Hypertension     Obesity     Obstructive sleep apnea (adult) (pediatric) 11/30/2012    could not tolerate CPAP     Other ill-defined conditions(799.89)     LE varicosities, hyperlipidemia    Sciatica     Unspecified essential hypertension 11/30/2012       Current Outpatient Medications on File Prior to Visit   Medication Sig Dispense Refill    metoprolol tartrate (LOPRESSOR) 100 mg IR tablet Take 1 Tab by mouth two (2) times a day. 180 Tab 1    lisinopril (PRINIVIL, ZESTRIL) 40 mg tablet Take 1 Tab by mouth daily. 90 Tab 1    ezetimibe (ZETIA) 10 mg tablet Take 1 Tab by mouth daily. 90 Tab 1    metFORMIN ER (GLUCOPHAGE XR) 500 mg tablet Take 2 Tabs by mouth two (2) times a day. 180 Tab 1    glipiZIDE (GLUCOTROL) 5 mg tablet Take 1 Tab by mouth two (2) times a day. 180 Tab 1    naproxen (NAPROSYN) 500 mg tablet Take 1 Tab by mouth two (2) times daily (with meals). 30 Tab 0    pantoprazole (PROTONIX) 40 mg tablet Take 1 Tab by mouth daily. 30 Tab 0    allopurinol (ZYLOPRIM) 300 mg tablet Take 300 mg by mouth daily.  flash glucose sensor (FREESTYLE BRO 14 DAY SENSOR) kit Check your blood sugars 4 times a day 1 Kit 0    empagliflozin (JARDIANCE) 10 mg tablet Take 1 Tab by mouth daily. 90 Tab 1    indomethacin (INDOCIN) 25 mg capsule 1-2 tid prn pain, take with food. 90 Cap 2     No current facility-administered medications on file prior to visit. Allergies   Allergen Reactions    Diovan [Valsartan] Other (comments)     Boils & knots al over head & upper body    Diovan [Valsartan] Hives    Elavil Other (comments)     Hallucinations. Seeing shadows and hearing voices.       Elavil Other (comments)     confusion    Statins-Hmg-Coa Reductase Inhibitors Other (comments)       Past Surgical History:   Procedure Laterality Date    HX APPENDECTOMY      HX ORTHOPAEDIC      L gt toe       Social History     Socioeconomic History    Marital status:      Spouse name: Not on file    Number of children: Not on file    Years of education: Not on file    Highest education level: Not on file   Occupational History    Not on file   Social Needs    Financial resource strain: Not on file    Food insecurity:     Worry: Not on file     Inability: Not on file    Transportation needs:     Medical: Not on file     Non-medical: Not on file   Tobacco Use    Smoking status: Former Smoker     Types: Cigarettes     Last attempt to quit: 1990     Years since quittin.2    Smokeless tobacco: Never Used   Substance and Sexual Activity    Alcohol use: Not Currently     Alcohol/week: 0.0 standard drinks     Comment: social    Drug use: No    Sexual activity: Yes     Partners: Female   Lifestyle    Physical activity:     Days per week: Not on file     Minutes per session: Not on file    Stress: Not on file   Relationships    Social connections:     Talks on phone: Not on file     Gets together: Not on file     Attends Bahai service: Not on file     Active member of club or organization: Not on file     Attends meetings of clubs or organizations: Not on file     Relationship status: Not on file    Intimate partner violence:     Fear of current or ex partner: Not on file     Emotionally abused: Not on file     Physically abused: Not on file     Forced sexual activity: Not on file   Other Topics Concern    Not on file   Social History Narrative    ** Merged History Encounter **            Patient Active Problem List   Diagnosis Code    Essential hypertension I10    Obstructive sleep apnea (adult) (pediatric) G47.33    Gout M10.9    Hyperlipidemia E78.5    Cardiomyopathy, dilated, nonischemic ([address] I42.0    Prediabetes R73.03    Avascular necrosis of bone of left hip ([address] M87.052    Severe obesity ([address] E66.01    Uncontrolled type 2 diabetes mellitus with hyperglycemia (HCC) E11.65    Hypertriglyceridemia E78.1         Objective:   Vitals - reviewed  Visit Vitals  /84   Pulse 69   Temp 97.9 °F (36.6 °C)   Resp 18   Ht 5' 6\" (1.676 m)   Wt 237 lb (107.5 kg)   SpO2 100%   BMI 38.25 kg/m²        Physical Exam  HENT:      Right Ear: Tympanic membrane normal.      Left Ear: Tympanic membrane normal.      Nose: No mucosal edema or congestion. Mouth/Throat:      Pharynx: Posterior oropharyngeal erythema present. Tonsils: Tonsillar exudate present. Cardiovascular:      Rate and Rhythm: Normal rate and regular rhythm. Pulses: Normal pulses. Heart sounds: Normal heart sounds. Pulmonary:      Effort: Pulmonary effort is normal. No respiratory distress. Breath sounds: Normal breath sounds. No wheezing. Abdominal:      General: Bowel sounds are normal. There is no distension. Tenderness: There is no abdominal tenderness. There is no guarding. Musculoskeletal: Normal range of motion. General: No swelling or tenderness. Right lower leg: No edema. Left lower leg: No edema. Assessment and orders:       ICD-10-CM ICD-9-CM    1. Uncontrolled type 2 diabetes mellitus with hyperglycemia (HCC) E11.65 250.02    2. Sore throat J02.9 462 AMB POC RAPID STREP A   3. Other migraine without status migrainosus, intractable G43.819 346.81 REFERRAL TO NEUROLOGY   4. Strep pharyngitis J02.0 034.0    5. Dyslipidemia E78.5 272.4      Diagnoses and all orders for this visit:    1. Uncontrolled type 2 diabetes mellitus with [facility] A1C 11. 9. patietn compliant with metfromin 1000 mg BID and glipizide 5 mg BID. Stopped Jardiance due to cost and declined to start insulin. Patient does not check BG at home as he is afriad of needles and marisela Mejia is expensive for him.  Discussed at length the importance of checking his BG to allow us to gague how his BG is well controlled. He undesrtands the risks and oplications of diabetes and declined checking his. Advised on diet and exercise. 2. Other migraine without status migrainosus, intractable: headache and blurry vision resolved today. Has been taking migraine Excedrin. Reports that his vision from distance is getting worse and the last time he had seen ophthalmology was one year ago. Referral to neurology provided again.  -     REFERRAL TO NEUROLOGY    3. Strep pharyngitis: sore throat x 2 days. Afebrile. Grandson sick at [facility] with cold likey symptoms. POC strep positive. Will treat with amoxicillin. Advised to bring grandson to be checked for strep as well    4. Dyslipidemia: , Does not tolerate statins due to blisters and rashes and was placed on zeita one year ago. Reports compliance to medication since last visit. Follow up with cardiology to discuss other options for HLD. Counseled on diet modifications. Other orders  -     amoxicillin (AMOXIL) 500 mg capsule; Take 1 Cap by mouth two (2) times a day for 10 days. Pt was discussed with Dr. Cliff Zayas (attending physician). I have reviewed patient medical and social history and medications. I have reviewed pertinent labs results and other data. I have discussed the diagnosis with the patient and the intended plan as seen in the above orders. The patient has received an after-visit summary and questions were answered concerning future plans. I have discussed medication side effects and warnings with the patient as well.     Fidel Willis MD- [address] [facility]  02/25/20

## 2020-02-26 NOTE — PROGRESS NOTES
[address] Medicine Residency Attending Addendum:  Patient encounter was discussed on the day of the encounter with Robert Carter MD, performing the key elements of the service. I discussed the findings, assessment and plan with the resident and agree with the resident's findings and plan as documented in the resident's note.       Daniel Ballesteros MD, CAQSM, RMSK

## 2020-04-15 RX ORDER — METFORMIN HYDROCHLORIDE 500 MG/1
1000 TABLET, EXTENDED RELEASE ORAL 2 TIMES DAILY
Qty: 180 TAB | Refills: 1 | Status: SHIPPED | OUTPATIENT
Start: 2020-04-15

## 2020-07-24 ENCOUNTER — TELEPHONE (OUTPATIENT)
Dept: FAMILY MEDICINE CLINIC | Age: 55
End: 2020-07-24

## 2020-07-24 NOTE — TELEPHONE ENCOUNTER
----- Message from Libertad Salmeron sent at 7/24/2020 10:43 AM EDT -----  Regarding: Glory Maryamarjit  Level 1/Escalated Issue      Caller's first and last name and relationship (if not the patient):      Best contact number(s): [phone]      What are the symptoms: Vomiting and sore throat       Transfer successful - yes/no (include outcome): N/A      Transfer declined - yes/no (include reason): N/A      Was caller advised to seek appropriate level of care - yes/no: Yes       Details to clarify the request: pt requesting ; offered next available vv pt requesting to schedule for an earlier date; pt requesting to schedule appt with .         Kee Thackre

## 2020-09-15 ENCOUNTER — VIRTUAL VISIT (OUTPATIENT)
Dept: FAMILY MEDICINE CLINIC | Age: 55
End: 2020-09-15
Payer: MEDICARE

## 2020-09-15 DIAGNOSIS — Z20.822 ENCOUNTER FOR LABORATORY TESTING FOR COVID-19 VIRUS: ICD-10-CM

## 2020-09-15 DIAGNOSIS — J02.9 SORE THROAT: ICD-10-CM

## 2020-09-15 LAB
S PYO AG THROAT QL: NEGATIVE
VALID INTERNAL CONTROL?: YES

## 2020-09-15 PROCEDURE — 87880 STREP A ASSAY W/OPTIC: CPT | Performed by: FAMILY MEDICINE

## 2020-09-15 PROCEDURE — 99442 PR PHYS/QHP TELEPHONE EVALUATION 11-20 MIN: CPT | Performed by: FAMILY MEDICINE

## 2020-09-15 NOTE — PROGRESS NOTES
Vanessa Ponce  47 y.o. male  1965  [address] [facility] [address]  [id number]    [phone] (home)      [address]:    Telephone Encounter  Rosalind Bell MD       Encounter Date: 9/15/2020    Consent:  He and/or the health care decision maker is aware that that he may receive a bill for this telephone service, depending on his insurance coverage, and has provided verbal consent to proceed: Yes    Chief Complaint   Patient presents with    Sore Throat       History of Present Skyler Berry is a 47 y.o. male was evaluated by telephone. Linebacker  used     Today: Pt reports that his throat and head started hurting 5 days. Pt also feels fatigued. Pt reports it is still hurting but has improved some with ibuprofen 400mg every 8 hours. Denies any fevers but is concerned it could be COVID-19 and would like to be tested. Denies any sick contacts       Review of Systems   Review of Systems   Constitutional: Positive for malaise/fatigue. Negative for chills and fever. HENT: Positive for sore throat. Negative for congestion. Respiratory: Negative for cough and shortness of breath. Cardiovascular: Negative for chest pain and palpitations. Skin: Negative for rash. Neurological: Negative for dizziness. Vitals/Objective:     General: alert, cooperative, no distress   Mental  status: mental status: alert, oriented to person, place, and time, normal mood, behavior, speech, dress, motor activity, and thought processes   Resp: resp: normal effort and no respiratory distress   Neuro: neuro: no gross deficits   Skin: skin: no discoloration or lesions of concern on visible areas   Due to this being a TeleHealth evaluation, many elements of the physical examination are unable to be assessed. Assessment and Plan:     1. Sore throat  - NOVEL CORONAVIRUS (COVID-19);  Future  - AMB POC RAPID STREP A  - discussed supportive therapy     2. Encounter for laboratory testing for COVID-19 virus  - NOVEL CORONAVIRUS (COVID-19); Future  - discussed need to self quarantine until results are back     Total Time: minutes: 11-20 minutes      I affirm this is a Patient Initiated Episode with an Established Patient who has not had a related appointment within my department in the past 7 days or scheduled within the next 24 hours. Note: not billable if this call serves to triage the patient into an appointment for the relevant concern     We discussed the expected course, resolution and complications of the diagnosis(es) in detail. Medication risks, benefits, costs, interactions, and alternatives were discussed as indicated. I advised him to contact the office if his condition worsens, changes or fails to improve as anticipated. He expressed understanding with the diagnosis(es) and plan. Patient understands that this encounter was a temporary measure, and the importance of further follow up and examination was emphasized. Patient verbalized understanding. Electronically Signed: Tatiana Parada MD    Providers location when delivering service: home         ICD-10-CM ICD-9-CM    1. Sore throat  J02.9 462 NOVEL CORONAVIRUS (COVID-19)      AMB POC RAPID STREP A   2. Encounter for laboratory testing for COVID-19 virus  Z11.59 V73.89 NOVEL CORONAVIRUS (COVID-19)       Pursuant to the emergency declaration under the [facility] Replaced by [facility] waiver authority and the [employer] Act, this Virtual  Visit was conducted, with patient's consent, to reduce the patient's risk of exposure to COVID-19 and provide continuity of care for an established patient. Services were provided through a video synchronous discussion virtually to substitute for in-person clinic visit.    History   Patients past medical, surgical and family histories were personally reviewed and updated.       Past Medical History:   Diagnosis Date    Arthritis     gout    Cardiomyopathy ([facility] [address]     LVEF was reported 30% in 2013; normal LVEF 3/19     Gout     Hypercholesterolemia     Hypertension     Obesity     Obstructive sleep apnea (adult) (pediatric) 2012    could not tolerate CPAP     Other ill-defined conditions(799.89)     LE varicosities, hyperlipidemia    Sciatica     Unspecified essential hypertension 2012     Past Surgical History:   Procedure Laterality Date    HX APPENDECTOMY      HX ORTHOPAEDIC      L gt toe     Family History   Problem Relation Age of Onset    Diabetes Mother     Hypertension Mother      Social History     Socioeconomic History    Marital status:      Spouse name: Not on file    Number of children: Not on file    Years of education: Not on file    Highest education level: Not on file   Occupational History    Not on file   Social Needs    Financial resource strain: Not on file    Food insecurity     Worry: Not on file     Inability: Not on file    Transportation needs     Medical: Not on file     Non-medical: Not on file   Tobacco Use    Smoking status: Former Smoker     Types: Cigarettes     Last attempt to quit: 1990     Years since quittin.7    Smokeless tobacco: Never Used   Substance and Sexual Activity    Alcohol use: Not Currently     Alcohol/week: 0.0 standard drinks     Comment: social    Drug use: No    Sexual activity: Yes     Partners: Female   Lifestyle    Physical activity     Days per week: Not on file     Minutes per session: Not on file    Stress: Not on file   Relationships    Social connections     Talks on phone: Not on file     Gets together: Not on file     Attends Jain service: Not on file     Active member of club or organization: Not on file     Attends meetings of clubs or organizations: Not on file     Relationship status: Not on file    Intimate partner violence     Fear of current or ex partner: Not on file     Emotionally abused: Not on file     Physically abused: Not on file     Forced sexual activity: Not on file   Other Topics Concern    Not on file   Social History Narrative    ** Merged History Encounter **                 Current Medications/Allergies   Medications and Allergies reviewed:    Current Outpatient Medications   Medication Sig Dispense Refill    metFORMIN ER (GLUCOPHAGE XR) 500 mg tablet Take 2 Tabs by mouth two (2) times a day. 180 Tab 1    metoprolol tartrate (LOPRESSOR) 100 mg IR tablet Take 1 Tab by mouth two (2) times a day. 180 Tab 1    lisinopril (PRINIVIL, ZESTRIL) 40 mg tablet Take 1 Tab by mouth daily. 90 Tab 1    ezetimibe (ZETIA) 10 mg tablet Take 1 Tab by mouth daily. 90 Tab 1    glipiZIDE (GLUCOTROL) 5 mg tablet Take 1 Tab by mouth two (2) times a day. 180 Tab 1    flash glucose sensor (FREESTYLE BRO 14 DAY SENSOR) kit Check your blood sugars 4 times a day 1 Kit 0    naproxen (NAPROSYN) 500 mg tablet Take 1 Tab by mouth two (2) times daily (with meals). 30 Tab 0    pantoprazole (PROTONIX) 40 mg tablet Take 1 Tab by mouth daily. 30 Tab 0    empagliflozin (JARDIANCE) 10 mg tablet Take 1 Tab by mouth daily. 90 Tab 1    allopurinol (ZYLOPRIM) 300 mg tablet Take 300 mg by mouth daily.  indomethacin (INDOCIN) 25 mg capsule 1-2 tid prn pain, take with food. 90 Cap 2     Allergies   Allergen Reactions    Diovan [Valsartan] Other (comments)     Boils & knots al over head & upper body    Diovan [Valsartan] Hives    Elavil Other (comments)     Hallucinations. Seeing shadows and hearing voices.       Elavil Other (comments)     confusion    Statins-Hmg-Coa Reductase Inhibitors Other (comments)

## 2020-09-15 NOTE — PROGRESS NOTES
[address] Medicine Residency Attending Addendum:  Dr. Palak Hoffman MD,  the patient and I were not physically present during this encounter. The resident and I are concurrently monitoring the patient care through appropriate telecommunication technology. I discussed the findings, assessment and plan with the resident and agree with the resident's findings and plan as documented in the resident's note.       Cecily Rodrigez MD

## 2020-09-17 LAB — SARS-COV-2, COV2NT: NOT DETECTED

## 2020-10-20 NOTE — PROGRESS NOTES
[address]     CHIEF COMPLAINT:   Chief Complaint   Patient presents with    Back Pain     left lower back pain x1.5 weeks. 7/10. Pain is getting worse. HISTORY OF PRESENT ILLNESS:  Heaven Blanco is a 47 y.o. male who presents for left lower back pain    Pain started 10/11/2020 when he was leaning over in the grocery store picking up a pice of meat. He feelslike a tightening rubber band sensation on his left lower back. It is localized to left lumbar spine. The patient describes it as sharp pinching muscle pain. He does report radiating/refering symptoms to his left buttocks and into his groin. This pain is made worse by trunkal movements. It is made better by taking tylenol but only by a little. It is worse in the evening when he is sleeping. The patient reports it is associated with a sense of heavyness, but is not associated with bowel/bladder incontinence, constipation, urinary retention, weakness. PMHx:  Past Medical History:   Diagnosis Date    Arthritis     gout    Cardiomyopathy ([facility])     LVEF was reported 30% in 2013; normal LVEF 3/19     Gout     Hypercholesterolemia     Hypertension     Obesity     Obstructive sleep apnea (adult) (pediatric) 11/30/2012    could not tolerate CPAP     Other ill-defined conditions(799.89)     LE varicosities, hyperlipidemia    Sciatica     Unspecified essential hypertension 11/30/2012     Meds:   Current Outpatient Medications   Medication Sig Dispense Refill    meloxicam (MOBIC) 7.5 mg tablet Take 1 Tab by mouth daily for 20 days. 10 Tab 0    methocarbamoL (ROBAXIN) 500 mg tablet Take 1 Tab by mouth three (3) times daily. 30 Tab 0    metFORMIN ER (GLUCOPHAGE XR) 500 mg tablet Take 2 Tabs by mouth two (2) times a day. 180 Tab 1    metoprolol tartrate (LOPRESSOR) 100 mg IR tablet Take 1 Tab by mouth two (2) times a day. 180 Tab 1    lisinopril (PRINIVIL, ZESTRIL) 40 mg tablet Take 1 Tab by mouth daily.  80 Tab 1    ezetimibe (ZETIA) 10 mg tablet Take 1 Tab by mouth daily. 90 Tab 1    glipiZIDE (GLUCOTROL) 5 mg tablet Take 1 Tab by mouth two (2) times a day. 180 Tab 1    empagliflozin (JARDIANCE) 10 mg tablet Take 1 Tab by mouth daily. 90 Tab 1    allopurinol (ZYLOPRIM) 300 mg tablet Take 300 mg by mouth daily.  flash glucose sensor (FREESTYLE BRO 14 DAY SENSOR) kit Check your blood sugars 4 times a day 1 Kit 0    pantoprazole (PROTONIX) 40 mg tablet Take 1 Tab by mouth daily. 30 Tab 0     Allergies: Allergies   Allergen Reactions    Diovan [Valsartan] Other (comments)     Boils & knots al over head & upper body    Diovan [Valsartan] Hives    Elavil Other (comments)     Hallucinations. Seeing shadows and hearing voices.       Elavil Other (comments)     confusion    Statins-Hmg-Coa Reductase Inhibitors Other (comments)     Smoker:  Social History     Tobacco Use   Smoking Status Former Smoker    Types: Cigarettes    Last attempt to quit: 1990    Years since quittin.8   Smokeless Tobacco Never Used     ETOH:   Social History     Substance and Sexual Activity   Alcohol Use Not Currently    Alcohol/week: 0.0 standard drinks    Comment: social     FH:   Family History   Problem Relation Age of Onset    Diabetes Mother     Hypertension Mother      ROS:  Review of Systems:   Constitutional: No fevers, chills, night sweats,   Eyes: No vision loss, diplopia, blurry vision, redness, pruritus  ENT: No hearing loss, smell, swallowing difficulties  CV: No chest pain, edema, palpitations  Lungs: No SOB, WAGNER, wheeze, cough, hemoptysis  GI: No nausea, vomiting, diarrhea, constipation, hematochezia, melena, abdominal pain  : No dysuria, hematuria, nocturia, frequency, retention  Endocrine: No hot or cold intolerance, polyuria, polydipsia, polyphagia  Hematologic: No Lymphadenopathy, excess bleeding, bruising   Immunologic: No Hives, sinus congestion, allergies  Integument: No new rashes, pruritus, alopecia  Psychiatric: No hallucinations, depression, anxiety, suicidal ideation, insomnia  Neurological: No head ache, weakness, numbness, tingling, dizziness  Musculoskeletal: No joint swelling, arthralgia, myalgia, but is reporting low back and left buttocks pain    PHYSICAL EXAM:  VITAL SIGNS:   Visit Vitals  Blood Pressure 136/87 (BP 1 Location: Right arm, BP Patient Position: Sitting)   Pulse 82   Temperature 98 °F (36.7 °C) (Temporal)   Respiration 20   Height 5' 6\" (1.676 m)   Weight 234 lb (106.1 kg)   Oxygen Saturation 96%   Body Mass Index 37.77 kg/m²       GENERAL:  WN/WD, male, in NAD,   HEENT:  Atraumatic, normocephalic, no auricular deformities, hearing intact,   NECK:  Adequate ROM  HEART:  No edema, no JVD, circulation intact, extremities warm and well perfused  LUNGS: No resp dist, SoB, audible wheeze, accessory muscle use or air hunger  SKIN:   Warm, dry, intact, non-erythemic, no ulcers, or rashes   NEURO Mental Status: Alert, Intelligible & fluent speech,   normal mentation & congruent affect     answers questions, follows commands, comprehension intact  CN: CN 2-12 grossly intact    Sensory: intact LT  lower extremities symmetric bilaterally        Manual Muscle Testing Right Left    Hip Flexion   5 5    Knee Extension  5 5    Knee Flexion   5 5    Ankle Dorsiflexion  5 5    Ankle Plantar Flexion  5 5      Reflexes:    Patellar (L4): 2/4 B/L    Achilles (S1): 2/4 B/L    Clonus: negative B/L      Gait: Antalgic the left, non-Ataxic, uses a single-point cane    MSK:     Low Back Exam:   Gait is normal.  ROM: The patient has reduced forward flexion, reduced extension, full rotation, & full but painful side bending to the left and reduced and painful sidebending to the right. Inspection: No evidence of scoliosis.    Palpation: The spinous processes are nontender, the paraspinal muscles are nontender, the SI joints are nontender, the sciatic notches are nontender, and the greater trochanters are nontender. There is no evidence of instability. There is tenderness over the left piriformis muscle  There is normal muscle strength and tone. Straight leg test is negative. Left posterior pain with Cammy's test.    LABORATORY STUDIES:  No current labs at this time    IMAGING STUDIES:  CT scan 05/13/2019 chest abdomen pelvis does not demonstrate any significant lumbar arthritis or degenerative disc disease. ASSESSMENT/PLAN:    ICD-10-CM ICD-9-CM    1. Spasm of left piriformis muscle  M62.838 728.85      Left piriformis spasm: We will try conservative course with muscle relaxants and anti-inflammatories. Given home exercises as patient was preferring not to go to physical therapy at this time. Physical therapy/HEP: Given home exercise plan for hip strengthening  Medications: Robaxin 500 nightly, up to 3 times daily as needed muscle spasm, meloxicam 7.5 daily, Tylenol 1000 mg 3 times daily  Follow-up: 6 weeks, can cancel appointment if no pain      Dmitriy Albright D.O.   Physical Medicine & Rehabilitation  Sports Medicine Fellow

## 2020-10-21 ENCOUNTER — OFFICE VISIT (OUTPATIENT)
Dept: FAMILY MEDICINE CLINIC | Age: 55
End: 2020-10-21
Payer: MEDICARE

## 2020-10-21 VITALS
DIASTOLIC BLOOD PRESSURE: 87 MMHG | TEMPERATURE: 98 F | BODY MASS INDEX: 37.61 KG/M2 | OXYGEN SATURATION: 96 % | WEIGHT: 234 LBS | HEART RATE: 82 BPM | SYSTOLIC BLOOD PRESSURE: 136 MMHG | RESPIRATION RATE: 20 BRPM | HEIGHT: 66 IN

## 2020-10-21 DIAGNOSIS — M62.838 SPASM OF LEFT PIRIFORMIS MUSCLE: Primary | ICD-10-CM

## 2020-10-21 PROCEDURE — G8754 DIAS BP LESS 90: HCPCS | Performed by: PHYSICAL MEDICINE & REHABILITATION

## 2020-10-21 PROCEDURE — G8752 SYS BP LESS 140: HCPCS | Performed by: PHYSICAL MEDICINE & REHABILITATION

## 2020-10-21 PROCEDURE — G8417 CALC BMI ABV UP PARAM F/U: HCPCS | Performed by: PHYSICAL MEDICINE & REHABILITATION

## 2020-10-21 PROCEDURE — 99213 OFFICE O/P EST LOW 20 MIN: CPT | Performed by: PHYSICAL MEDICINE & REHABILITATION

## 2020-10-21 PROCEDURE — G8432 DEP SCR NOT DOC, RNG: HCPCS | Performed by: PHYSICAL MEDICINE & REHABILITATION

## 2020-10-21 PROCEDURE — G8427 DOCREV CUR MEDS BY ELIG CLIN: HCPCS | Performed by: PHYSICAL MEDICINE & REHABILITATION

## 2020-10-21 PROCEDURE — 3017F COLORECTAL CA SCREEN DOC REV: CPT | Performed by: PHYSICAL MEDICINE & REHABILITATION

## 2020-10-21 RX ORDER — METHOCARBAMOL 500 MG/1
500 TABLET, FILM COATED ORAL 3 TIMES DAILY
Qty: 30 TAB | Refills: 0 | Status: SHIPPED | OUTPATIENT
Start: 2020-10-21

## 2020-10-21 RX ORDER — MELOXICAM 7.5 MG/1
7.5 TABLET ORAL DAILY
Qty: 10 TAB | Refills: 0 | Status: SHIPPED | OUTPATIENT
Start: 2020-10-21 | End: 2020-11-10

## 2020-10-21 NOTE — PROGRESS NOTES
Identified Patient with two Patient identifiers (Name and ). Two Patient Identifiers confirmed. Reviewed record in preparation for visit and have obtained necessary documentation. Chief Complaint   Patient presents with    Back Pain     left lower back pain x1.5 weeks. 7/10. Pain is getting worse. Visit Vitals  /87 (BP 1 Location: Right arm, BP Patient Position: Sitting)   Pulse 82   Temp 98 °F (36.7 °C) (Temporal)   Resp 20   Ht 5' 6\" (1.676 m)   Wt 234 lb (106.1 kg)   SpO2 96%   BMI 37.77 kg/m²       1. Have you been to the ER, urgent care clinic since your last visit? Hospitalized since your last visit? No    2. Have you seen or consulted any other health care providers outside of the [address] [place] [address] since your last visit? Include any pap smears or colon screening.  No

## 2020-10-21 NOTE — PATIENT INSTRUCTIONS
1. Take meloxicam once daily in the morning with food until medication completed 2. Take Robaxin before bed at night, this medication may make you sleepy, can take up to 3 times a day as needed for muscle soreness 3. Take Tylenol 1000 mg up to 3 times daily 4. Start doing hip exercises at home as given at this appointment 5. If pain does not improve follow-up in 6 weeks. If pain resolves do not need to follow-up

## 2020-12-28 ENCOUNTER — TELEPHONE (OUTPATIENT)
Dept: FAMILY MEDICINE CLINIC | Age: 55
End: 2020-12-28

## 2020-12-28 NOTE — TELEPHONE ENCOUNTER
----- Message from Nick Estrada sent at 12/28/2020  9:19 AM EST -----  Regarding: Dr. Maria Santana Message/Vendor Calls    Caller's first and last name: [facility]      Reason for call: Status Update       Callback required yes/no and why: Yes       Best contact number(s): [phone]      Details to clarify the request: Calling for Drug Therapy alert for statin use, sent over last week and would like a status update,  please call Carlos Arias with an update.        Nick Estrada

## 2021-04-16 ENCOUNTER — TRANSCRIBE ORDER (OUTPATIENT)
Dept: SCHEDULING | Age: 56
End: 2021-04-16

## 2021-04-16 DIAGNOSIS — M67.432 GANGLION OF LEFT WRIST: Primary | ICD-10-CM

## 2021-04-16 DIAGNOSIS — M65.832 OTHER SYNOVITIS AND TENOSYNOVITIS, LEFT FOREARM: ICD-10-CM

## 2021-05-06 ENCOUNTER — HOSPITAL ENCOUNTER (OUTPATIENT)
Dept: MRI IMAGING | Age: 56
Discharge: HOME | End: 2021-05-06
Attending: ORTHOPAEDIC SURGERY

## 2021-05-06 DIAGNOSIS — M67.432 GANGLION OF LEFT WRIST: ICD-10-CM

## 2021-05-06 DIAGNOSIS — M65.832 OTHER SYNOVITIS AND TENOSYNOVITIS, LEFT FOREARM: ICD-10-CM

## 2022-03-18 PROBLEM — E11.65 UNCONTROLLED TYPE 2 DIABETES MELLITUS WITH HYPERGLYCEMIA (HCC): Status: ACTIVE | Noted: 2019-03-13

## 2022-03-19 PROBLEM — E78.1 HYPERTRIGLYCERIDEMIA: Status: ACTIVE | Noted: 2019-03-13

## 2022-03-20 PROBLEM — E66.01 SEVERE OBESITY: Status: ACTIVE | Noted: 2019-02-01

## 2023-01-24 NOTE — PATIENT INSTRUCTIONS

## 2025-02-20 ENCOUNTER — TELEPHONE (OUTPATIENT)
Age: 60
End: 2025-02-20

## 2025-02-27 ENCOUNTER — TELEPHONE (OUTPATIENT)
Age: 60
End: 2025-02-27

## 2025-02-27 NOTE — TELEPHONE ENCOUNTER
Problem: Pressure Injury, Risk for  Goal: # Skin remains intact  Outcome: Outcome Met, Continue evaluating goal progress toward completion  Goal: No new pressure injury (PI) development  Outcome: Outcome Met, Continue evaluating goal progress toward completion  Goal: # Verbalizes understanding of PI risk factors and prevention strategies  Description: Document education using the patient education activity.   Outcome: Outcome Met, Continue evaluating goal progress toward completion  Goal: Comfort optimized with pressure injury prevention strategies guided by patient/family preference. (Hospice)  Outcome: Outcome Met, Continue evaluating goal progress toward completion     Problem: Pressure Injury Actual  Goal: # No deterioration in pressure injury (PI)  Outcome: Outcome Met, Continue evaluating goal progress toward completion  Goal: # Verbalizes pressure injury management  Description: Document education using the patient education activity.  Outcome: Outcome Met, Continue evaluating goal progress toward completion  Goal: Wound care provided to promote comfort needs (Hospice)  Outcome: Outcome Met, Continue evaluating goal progress toward completion     Problem: Non-Pressure Injury Wound  Goal: # No deterioration in wound  Outcome: Outcome Met, Continue evaluating goal progress toward completion  Goal: # Verbalizes understanding of wound and wound care  Description: If abnormality is a skin tear, avoid using tape on skin including transparent dressings. Document education using the patient education activity.  Outcome: Outcome Met, Continue evaluating goal progress toward completion  Goal: Participates in wound care activities  Outcome: Outcome Met, Continue evaluating goal progress toward completion  Goal: Wound care provided to promote comfort needs (Hospice)  Outcome: Outcome Met, Continue evaluating goal progress toward completion     Problem: VTE, Risk for  Goal: # Verbalizes understanding of VTE risk factors  Called patient left message for patient to call denae   and prevention  Description: Document education using the patient education activity.   Outcome: Outcome Met, Continue evaluating goal progress toward completion  Goal: Demonstrates ability to administer injectable anticoagulants if ordered for d/c  Description: Document education using the patient education activity.  Outcome: Outcome Met, Continue evaluating goal progress toward completion     Problem: At Risk for Falls  Goal: # Patient does not fall  Outcome: Outcome Met, Continue evaluating goal progress toward completion  Goal: # Takes action to control fall-related risks  Outcome: Outcome Met, Continue evaluating goal progress toward completion  Goal: # Verbalizes understanding of fall risk/precautions  Description: Document education using the patient education activity  Outcome: Outcome Met, Continue evaluating goal progress toward completion     Problem: Fluid Volume Excess, Risk for  Goal: # Absence of Rapid Weight Gain (no more than 2kg in 24 hours)  Description: FVE Risk Patients may gain weight (but not more than 2 kg) but may not require aggressive treatment if in the absence of dyspnea; FVE (actual) patients should be monitored to achieve no weight gain.   Outcome: Outcome Met, Continue evaluating goal progress toward completion  Goal: # Absence of New Onset Dyspnea  Description: Dyspnea greater than SOB with Activity may be indicator of fluid volume excess  Outcome: Outcome Met, Continue evaluating goal progress toward completion  Goal: # Verbalizes understanding of FVE prevention plan  Description: Document on Patient Education Activity  Outcome: Outcome Met, Continue evaluating goal progress toward completion     Problem: Fluid Volume Excess  Goal: # Fluid Volume Excess Symptoms Resolved  Description: Treatment often consists of oxygen and respiratory support with diuretic therapy at doses that exceed usual dose (typically doubled).  Monitor patient response to treatment.    Acute dyspnea should  resolve quickly if dose is adequate and kidney function is adequate. Dyspnea/SOB should only be observed with Activity after effective treatment. Patient should be able to lie down comfortably, without SOB.  Outcome: Outcome Met, Continue evaluating goal progress toward completion  Goal: # Oxygenation is maintained (SpO2 greater than or equal to 90% or as ordered)  Outcome: Outcome Met, Continue evaluating goal progress toward completion  Goal: # Verbalizes understanding of FVE management plan  Description: Document on Patient Education Activity  Outcome: Outcome Met, Continue evaluating goal progress toward completion     Problem: Delirium, Risk for  Goal: # No symptoms of delirium  Description: Evaluate delirium symptoms under active problem when present  Outcome: Outcome Met, Continue evaluating goal progress toward completion  Goal: # Verbalizes understanding of delirium preventive strategies  Description: Document on Patient Education Activity   Outcome: Outcome Met, Continue evaluating goal progress toward completion

## 2025-03-03 ENCOUNTER — TELEPHONE (OUTPATIENT)
Age: 60
End: 2025-03-03

## 2025-04-22 ENCOUNTER — OFFICE VISIT (OUTPATIENT)
Age: 60
End: 2025-04-22
Payer: MEDICARE

## 2025-04-22 ENCOUNTER — TELEPHONE (OUTPATIENT)
Age: 60
End: 2025-04-22

## 2025-04-22 VITALS
SYSTOLIC BLOOD PRESSURE: 136 MMHG | HEART RATE: 96 BPM | OXYGEN SATURATION: 91 % | DIASTOLIC BLOOD PRESSURE: 80 MMHG | HEIGHT: 66 IN | BODY MASS INDEX: 36.96 KG/M2 | WEIGHT: 230 LBS

## 2025-04-22 DIAGNOSIS — Z87.891 HISTORY OF TOBACCO USE: ICD-10-CM

## 2025-04-22 DIAGNOSIS — I10 BENIGN ESSENTIAL HTN: Primary | ICD-10-CM

## 2025-04-22 DIAGNOSIS — R00.0 TACHYCARDIA: ICD-10-CM

## 2025-04-22 DIAGNOSIS — R42 DIZZINESS: ICD-10-CM

## 2025-04-22 PROCEDURE — 93005 ELECTROCARDIOGRAM TRACING: CPT | Performed by: INTERNAL MEDICINE

## 2025-04-22 PROCEDURE — 99204 OFFICE O/P NEW MOD 45 MIN: CPT | Performed by: INTERNAL MEDICINE

## 2025-04-22 PROCEDURE — 93010 ELECTROCARDIOGRAM REPORT: CPT | Performed by: INTERNAL MEDICINE

## 2025-04-22 PROCEDURE — 3079F DIAST BP 80-89 MM HG: CPT | Performed by: INTERNAL MEDICINE

## 2025-04-22 PROCEDURE — 3075F SYST BP GE 130 - 139MM HG: CPT | Performed by: INTERNAL MEDICINE

## 2025-04-22 RX ORDER — FINASTERIDE 5 MG/1
5 TABLET, FILM COATED ORAL DAILY
COMMUNITY
Start: 2025-02-18

## 2025-04-22 RX ORDER — COLCHICINE 0.6 MG/1
TABLET ORAL
COMMUNITY
Start: 2025-01-24

## 2025-04-22 RX ORDER — PANTOPRAZOLE SODIUM 40 MG/1
40 TABLET, DELAYED RELEASE ORAL DAILY
COMMUNITY
Start: 2025-02-18

## 2025-04-22 RX ORDER — SODIUM FLUORIDE1.1%, POTASSIUM NITRATE 5% 5.8; 57.5 MG/ML; MG/ML
GEL, DENTIFRICE DENTAL
COMMUNITY
Start: 2025-03-06

## 2025-04-22 RX ORDER — ERYTHROMYCIN 5 MG/G
OINTMENT OPHTHALMIC
COMMUNITY
Start: 2025-01-13

## 2025-04-22 RX ORDER — ALLOPURINOL 300 MG/1
300 TABLET ORAL DAILY
COMMUNITY

## 2025-04-22 RX ORDER — OLMESARTAN MEDOXOMIL 40 MG/1
40 TABLET ORAL DAILY
COMMUNITY
Start: 2025-02-07

## 2025-04-22 RX ORDER — METFORMIN HYDROCHLORIDE 500 MG/1
500 TABLET, EXTENDED RELEASE ORAL 2 TIMES DAILY
COMMUNITY
Start: 2025-04-03

## 2025-04-22 RX ORDER — EMPAGLIFLOZIN 25 MG/1
25 TABLET, FILM COATED ORAL DAILY
COMMUNITY
Start: 2025-04-12

## 2025-04-22 RX ORDER — METOPROLOL SUCCINATE 100 MG/1
100 TABLET, EXTENDED RELEASE ORAL DAILY
COMMUNITY
Start: 2025-04-03

## 2025-04-22 RX ORDER — LISINOPRIL 40 MG/1
40 TABLET ORAL DAILY
COMMUNITY

## 2025-04-22 ASSESSMENT — PATIENT HEALTH QUESTIONNAIRE - PHQ9
SUM OF ALL RESPONSES TO PHQ QUESTIONS 1-9: 0
SUM OF ALL RESPONSES TO PHQ QUESTIONS 1-9: 0
1. LITTLE INTEREST OR PLEASURE IN DOING THINGS: NOT AT ALL
2. FEELING DOWN, DEPRESSED OR HOPELESS: NOT AT ALL
SUM OF ALL RESPONSES TO PHQ QUESTIONS 1-9: 0
SUM OF ALL RESPONSES TO PHQ QUESTIONS 1-9: 0

## 2025-04-22 NOTE — PROGRESS NOTES
1. Have you been to the ER, urgent care clinic since your last visit?  Hospitalized since your last visit?No    2. Have you seen or consulted any other health care providers outside of the [facility] since your last visit?  Include any pap smears or colon screening. No

## 2025-04-22 NOTE — PROGRESS NOTES
[facility]: [place]  [phone]    HPI:  Mr. Harshal Hardwick is a 58 yo M [place] speaking gentleman with type 2 diabetes, essential hypertension, used , Jeannine #[id number].  He is here due to episodes of dizziness.  This happened mostly in February and March and he says was related to spikes in his blood sugar.  He denies any syncope.  No exertional chest pain.  His breathing has been stable.  He thinks he had a stress test greater than 10 years ago.  I do see an echo from 2019 that was normal.  At some point, he was told he had enlarged heart, but again his echo in 2019 was normal.   His EKG was normal sinus rhythm, heart rate in the 90s.   Soc hx. Tobacco quit >30 yrs ago  Fam hx. No early CAD  Assessment and Plan:  1. Dizziness, palpitations.  He does have occasional palpitations, though the dizziness he says was resolved after his blood sugars were better.  Will have him wear a 1-week loop monitor for further evaluation.   2. Essential hypertension.  Blood pressure is controlled.  3. Type 2 diabetes.  Adjustments per his primary care and apparently his blood sugars were flaring.  4. Tobacco use.  Quit greater than 30 years ago.    He  has a past medical history of Arthritis, Cardiomyopathy, Diabetes mellitus (HCC), Gout, Hypercholesterolemia, Hypertension, Obesity, Obstructive sleep apnea (adult) (pediatric), Other ill-defined conditions(799.89), Sciatica, and Unspecified essential hypertension.    All other systems negative except as above.     PE  Vitals:    04/22/25 0906   BP: 136/80   BP Site: Right Upper Arm   Patient Position: Sitting   BP Cuff Size: Large Adult   Pulse: 96   SpO2: 91%   Weight: 104.3 kg (230 lb)   Height: 1.676 m (5' 6\")    Body mass index is 37.12 kg/m².  General appearance - alert, well appearing, and in no distress  Mental status - affect appropriate to mood  Eyes - sclera anicteric, moist mucous membranes  Neck - supple, no JVD  Chest - clear to auscultation,

## 2025-04-22 NOTE — TELEPHONE ENCOUNTER
----- Message from SHANTE SMITH RN sent at 4/22/2025  9:45 AM EDT -----  Regarding: loop  Please order a 1 week loop monitor for palpitations per Dr. Barkley.     Thanks

## 2025-05-11 ENCOUNTER — RESULTS FOLLOW-UP (OUTPATIENT)
Age: 60
End: 2025-05-11

## 2025-05-12 NOTE — TELEPHONE ENCOUNTER
----- Message from Dr. Nnamdi Barkley MD sent at 5/11/2025  4:50 PM EDT -----  Please let pt know monitor was normal. Can follow with us prn. thx